# Patient Record
Sex: MALE | Race: WHITE | Employment: STUDENT | URBAN - METROPOLITAN AREA
[De-identification: names, ages, dates, MRNs, and addresses within clinical notes are randomized per-mention and may not be internally consistent; named-entity substitution may affect disease eponyms.]

---

## 2018-01-27 ENCOUNTER — OFFICE VISIT (OUTPATIENT)
Dept: FAMILY MEDICINE CLINIC | Age: 33
End: 2018-01-27

## 2018-01-27 VITALS
HEIGHT: 68 IN | BODY MASS INDEX: 32.13 KG/M2 | OXYGEN SATURATION: 98 % | DIASTOLIC BLOOD PRESSURE: 99 MMHG | TEMPERATURE: 99.4 F | SYSTOLIC BLOOD PRESSURE: 149 MMHG | WEIGHT: 212 LBS | RESPIRATION RATE: 16 BRPM | HEART RATE: 95 BPM

## 2018-01-27 DIAGNOSIS — F41.9 ANXIETY: ICD-10-CM

## 2018-01-27 DIAGNOSIS — M10.9 PODAGRA: Primary | ICD-10-CM

## 2018-01-27 DIAGNOSIS — Z13.31 SCREENING FOR DEPRESSION: ICD-10-CM

## 2018-01-27 RX ORDER — METHYLPREDNISOLONE 4 MG/1
TABLET ORAL
Qty: 1 DOSE PACK | Refills: 0 | Status: SHIPPED | OUTPATIENT
Start: 2018-01-27 | End: 2018-02-22

## 2018-01-27 RX ORDER — TRAMADOL HYDROCHLORIDE 50 MG/1
50 TABLET ORAL
Qty: 45 TAB | Refills: 0 | Status: SHIPPED | OUTPATIENT
Start: 2018-01-27 | End: 2020-08-18

## 2018-01-27 NOTE — PATIENT INSTRUCTIONS
Take medrol with food    Use tylenol or tramadol for pain    WELLNESS exam soon    If you drink alcohol, try to limit intake to no more than 1 beer (or one glass of wine or one shot of liquor) in any 24 hour period, and not daily.

## 2018-01-27 NOTE — PROGRESS NOTES
Sher Butler is a 29 y.o. male      Issues discussed today include:        Signs and symptoms:  Podagra left foot  Duration:  4 days  Context:  Known h/o gout  Location:  Left great toe  Quality:  Looks like gout  Severity:  Can be severe  Timin days constant  Modifying factors:  He drinks ETOH daily ( we discussed this)    Data reviewed or ordered today:  Needs CPx soon    Other problems include:  Patient Active Problem List   Diagnosis Code    Chest pain R07.9    Podagra M10.9       Medications:  Current Outpatient Prescriptions   Medication Sig Dispense Refill    methylPREDNISolone (MEDROL DOSEPACK) 4 mg tablet Take with food 1 Dose Pack 0    traMADol (ULTRAM) 50 mg tablet Take 1 Tab by mouth every six (6) hours as needed for Pain. Max Daily Amount: 200 mg. Indications: Pain 45 Tab 0    omeprazole (PRILOSEC) 20 mg capsule TAKE ONE CAPSULE BY MOUTH TWICE A DAY **INS COVERS 1/DAY* 60 Cap 6       Allergies:  No Known Allergies    LMP:  No LMP for male patient. Social History     Social History    Marital status: SINGLE     Spouse name: N/A    Number of children: N/A    Years of education: N/A     Occupational History    Not on file.      Social History Main Topics    Smoking status: Never Smoker    Smokeless tobacco: Current User     Types: Chew      Comment: once every 2 days    Alcohol use Yes      Comment: 2-3 drinks per night    Drug use: No    Sexual activity: Yes     Partners: Female     Other Topics Concern    Not on file     Social History Narrative         Family History   Problem Relation Age of Onset    Elevated Lipids Mother     High Cholesterol Mother     Pacemaker Paternal Grandfather     Coronary Artery Disease Paternal Uncle     Heart Attack Paternal Uncle      Other family history:  Gout, GERD, OA    Meaningful use:  done      ROS:  Headaches:  no  Chest Pain:  no  SOB:  no  Fevers:  no  Falls:  no  anxiety/depression/losing interest in doing things that were previously enjoyed:  no. PHQ2 = 0  Other significant ROS:  JAYLENE 7 = 5    No LMP for male patient. Physical Exam  Visit Vitals    BP (!) 149/99 (BP 1 Location: Left arm, BP Patient Position: Sitting)    Pulse 95    Temp 99.4 °F (37.4 °C)    Resp 16    Ht 5' 8\" (1.727 m)    Wt 212 lb (96.2 kg)    SpO2 98%    BMI 32.23 kg/m2     BP Readings from Last 3 Encounters:   01/27/18 (!) 149/99   10/15/14 140/90   09/16/14 134/90     Constitutional:  Appears well,  No Acute Distress, Vitals noted  Psychiatric:   Affect normal, Alert and cooperative, Oriented to person/place/time    Eyes:   Pupils equally round and reactive, EOMI, conjunctiva clear, eyelids normal  ENT:   External ears and nose normal/lips, teeth=OK/gums normal, TMs and Orophyarynx normal  Neck:   general inspection and Thyroid normal.  No abnormal cervical or supraclavicular nodes    Lungs:   clear to auscultation, good respiratory effort  Heart: Ausculation normal.  Regular rhythm. No cardiac murmurs. No carotid bruits or palpable thrills  Chest wall normal  Abd:  benign  Extremities:   without edema, good peripheral pulses  Skin:   Warm to palpation, without rashes, bruising, or suspicious lesions     MSK:  Podagra left great toe      Assessment:    Patient Active Problem List   Diagnosis Code    Chest pain R07.9    Podagra M10.9       Today's diagnoses are:    ICD-10-CM ICD-9-CM    1. Podagra M10.9 274.01 methylPREDNISolone (MEDROL DOSEPACK) 4 mg tablet      traMADol (ULTRAM) 50 mg tablet   2. Anxiety F41.9 300.00     JAYLENE 7=5, PHQ9 = 1   3. BMI 32.0-32.9,adult Z68.32 V85.32    4. Screening for depression Z13.89 V79.0     daily ETOH use, we discused this       Plan:  Orders Placed This Encounter    methylPREDNISolone (MEDROL DOSEPACK) 4 mg tablet     Sig: Take with food     Dispense:  1 Dose Pack     Refill:  0    traMADol (ULTRAM) 50 mg tablet     Sig: Take 1 Tab by mouth every six (6) hours as needed for Pain. Max Daily Amount: 200 mg. Indications: Pain     Dispense:  45 Tab     Refill:  0       See patient instructions  Patient Instructions   Take medrol with food    Use tylenol or tramadol for pain    WELLNESS exam soon    If you drink alcohol, try to limit intake to no more than 1 beer (or one glass of wine or one shot of liquor) in any 24 hour period, and not daily. refresh note:  done    AVS Printed:  done    Greater than 50% of today's 25 minute visit was counseling or coordination of care for the following reasons:    See diagnoses and orders, see patient instructions      We discussed health maintenance    BMI = Body mass index is 32.23 kg/(m^2). We discussed diet/exercise/healthy weight    We reviewed and updated pertinent past medical history in the problem list    Diagnoses and all orders for this visit:    1. Podagra  -     methylPREDNISolone (MEDROL DOSEPACK) 4 mg tablet; Take with food  -     traMADol (ULTRAM) 50 mg tablet; Take 1 Tab by mouth every six (6) hours as needed for Pain. Max Daily Amount: 200 mg. Indications: Pain    2. Anxiety  Comments:  JAYLENE 7=5, PHQ9 = 1    3. BMI 32.0-32.9,adult    4.  Screening for depression  Comments:  daily ETOH use, we discused this

## 2018-01-27 NOTE — LETTER
1/27/2018 10:37 AM 
 
Mr. Jacqueline Grant1 [facility] [address] Body mass index is 32.23 kg/(m^2). Focus on regular exercise (150 minutes each week) and healthy eating. Eat more fruits and vegetables. Eat more protein (egg whites, beans, and nuts you know you tolerate) and less carbohydrates (white bread, white rice, white pasta, white potatoes, sodas, and sweets). Eat appropriately small portion sizes. I suggest a wellness exam and fasting labs soon If you drink alcohol, try to limit intake to no more than 1 beer (or one glass of wine or one shot of liquor) in any 24 hour period, and not daily. Sincerely, Maeve Winchester MD

## 2018-01-27 NOTE — PROGRESS NOTES
Chief Complaint   Patient presents with    Gout     4 days ago gout flare up of redness, pain, swelling of the 1st digit of left toe     1. Have you been to the ER, urgent care clinic since your last visit? Hospitalized since your last visit? No    2. Have you seen or consulted any other health care providers outside of the [address] [place] [address] since your last visit? Include any pap smears or colon screening.  No

## 2018-01-27 NOTE — MR AVS SNAPSHOT
[address] 
[phone] Patient: Surendra Lawrence MRN: [id number] :1985 Visit Information Date & Time Provider Department Dept. Phone Encounter #  
 2018  9:45 AM Laura Ramirez MD [address] [facility] [phone] [id number] Your Appointments 2018  9:45 AM  
PHYSICAL PRE OP with Jacob Rojas MD  
[address] [facility] Appt Note: NP EST PCP, CPE, CP$20, SPT 18  
 [address]  
[phone]  
  
   
 [address] [facility] [address] [address] Upcoming Health Maintenance Date Due DTaP/Tdap/Td series (1 - Tdap) 2008 Influenza Age 5 to Adult 2017 Allergies as of 2018  Review Complete On: 2018 By: Evelyn Faith No Known Allergies Current Immunizations  Never Reviewed No immunizations on file. Not reviewed this visit You Were Diagnosed With   
  
 Codes Comments Podagra    -  Primary ICD-10-CM: M10.9 ICD-9-CM: 274.01 Anxiety     ICD-10-CM: F41.9 ICD-9-CM: 300.00 JAYLENE 7=5, PHQ9 = 1 BMI 32.0-32.9,adult     ICD-10-CM: H98.08 
ICD-9-CM: V85.32 Screening for depression     ICD-10-CM: Z13.89 ICD-9-CM: V79.0 daily ETOH use, we discused this Vitals BP Pulse Temp Resp Height(growth percentile) Weight(growth percentile) (!) 149/99 (BP 1 Location: Left arm, BP Patient Position: Sitting) 95 99.4 °F (37.4 °C) 16 5' 8\" (1.727 m) 212 lb (96.2 kg) SpO2 BMI Smoking Status 98% 32.23 kg/m2 Never Smoker Vitals History BMI and BSA Data Body Mass Index Body Surface Area  
 32.23 kg/m 2 2.15 m 2 Preferred Pharmacy Pharmacy Name Phone [id number] [place], [address] AT [facility] [phone] Your Updated Medication List  
  
   
 This list is accurate as of: 1/27/18 10:39 AM.  Always use your most recent med list.  
  
  
  
  
 methylPREDNISolone 4 mg tablet Commonly known as:  Ocasio Smart Take with food  
  
 omeprazole 20 mg capsule Commonly known as:  PRILOSEC  
TAKE ONE CAPSULE BY MOUTH TWICE A DAY **INS COVERS 1/DAY*  
  
 traMADol 50 mg tablet Commonly known as:  ULTRAM  
Take 1 Tab by mouth every six (6) hours as needed for Pain. Max Daily Amount: 200 mg. Indications: Pain Prescriptions Printed Refills  
 methylPREDNISolone (MEDROL DOSEPACK) 4 mg tablet 0 Sig: Take with food Class: Print  
 traMADol (ULTRAM) 50 mg tablet 0 Sig: Take 1 Tab by mouth every six (6) hours as needed for Pain. Max Daily Amount: 200 mg. Indications: Pain Class: Print Route: Oral  
  
Patient Instructions Take medrol with food Use tylenol or tramadol for pain WELLNESS exam soon If you drink alcohol, try to limit intake to no more than 1 beer (or one glass of wine or one shot of liquor) in any 24 hour period, and not daily. Introducing [facility]! [employer] introduces Pro[employer] patient portal. Now you can access parts of your medical record, email your doctor's office, and request medication refills online. 1. In your internet browser, go to [email or web address] 2. Click on the First Time User? Click Here link in the Sign In box. You will see the New Member Sign Up page. 3. Enter your Proxeon Access Code exactly as it appears below. You will not need to use this code after youve completed the sign-up process. If you do not sign up before the expiration date, you must request a new code. · Proxeon Access Code: [email or web address] Expires: 4/27/2018 10:39 AM 
 
4. Enter the last four digits of your Social Security Number (xxxx) and Date of Birth (mm/dd/yyyy) as indicated and click Submit. You will be taken to the next sign-up page. 5. Create a 33Ac[employer] ID. This will be your 33Ac[employer] login ID and cannot be changed, so think of one that is secure and easy to remember. 6. Create a [employer]Ac[employer] password. You can change your password at any time. 7. Enter your Password Reset Question and Answer. This can be used at a later time if you forget your password. 8. Enter your e-mail address. You will receive e-mail notification when new information is available in [address] 9. Click Sign Up. You can now view and download portions of your medical record. 10. Click the Download Summary menu link to download a portable copy of your medical information. If you have questions, please visit the Frequently Asked Questions section of the [employer] website. Remember, 33Ac[employer] is NOT to be used for urgent needs. For medical emergencies, dial 911. Now available from your iPhone and Android! Please provide this summary of care documentation to your next provider. Your primary care clinician is listed as Cary Tate. If you have any questions after today's visit, please call [phone].

## 2018-02-22 ENCOUNTER — OFFICE VISIT (OUTPATIENT)
Dept: FAMILY MEDICINE CLINIC | Age: 33
End: 2018-02-22

## 2018-02-22 VITALS
DIASTOLIC BLOOD PRESSURE: 88 MMHG | RESPIRATION RATE: 18 BRPM | HEART RATE: 62 BPM | HEIGHT: 68 IN | WEIGHT: 210 LBS | BODY MASS INDEX: 31.83 KG/M2 | SYSTOLIC BLOOD PRESSURE: 142 MMHG | TEMPERATURE: 97.5 F

## 2018-02-22 DIAGNOSIS — Z00.00 ROUTINE GENERAL MEDICAL EXAMINATION AT A HEALTH CARE FACILITY: Primary | ICD-10-CM

## 2018-02-22 DIAGNOSIS — M10.09 ACUTE IDIOPATHIC GOUT OF MULTIPLE SITES: ICD-10-CM

## 2018-02-22 DIAGNOSIS — R03.0 ELEVATED BLOOD PRESSURE READING: ICD-10-CM

## 2018-02-22 DIAGNOSIS — Z72.0 CHEWING TOBACCO USE: ICD-10-CM

## 2018-02-22 PROBLEM — M10.49 OTHER SECONDARY GOUT, MULTIPLE SITES: Status: ACTIVE | Noted: 2018-02-22

## 2018-02-22 PROBLEM — K21.9 GERD (GASTROESOPHAGEAL REFLUX DISEASE): Status: ACTIVE | Noted: 2018-02-22

## 2018-02-22 PROBLEM — G43.109 MIGRAINE WITH AURA AND WITHOUT STATUS MIGRAINOSUS, NOT INTRACTABLE: Status: ACTIVE | Noted: 2018-02-22

## 2018-02-22 RX ORDER — HYDROXYZINE PAMOATE 25 MG/1
CAPSULE ORAL
Refills: 5 | COMMUNITY
Start: 2018-01-30 | End: 2020-08-18

## 2018-02-22 RX ORDER — ALLOPURINOL 100 MG/1
100 TABLET ORAL DAILY
Qty: 30 TAB | Refills: 0 | Status: SHIPPED | OUTPATIENT
Start: 2018-02-22 | End: 2018-03-20 | Stop reason: SDUPTHER

## 2018-02-22 RX ORDER — COLCHICINE 0.6 MG/1
TABLET ORAL
Qty: 31 TAB | Refills: 5 | Status: SHIPPED | OUTPATIENT
Start: 2018-02-22 | End: 2018-08-17 | Stop reason: SDUPTHER

## 2018-02-22 NOTE — PROGRESS NOTES
Chief Complaint   Patient presents with    Establish Care     cpe    Gout     rt ankle     1. Have you been to the ER, urgent care clinic since your last visit? No Hospitalized since your last visit? No     2. Have you seen or consulted any other health care providers outside of the [address] [place] [address] since your last visit? Include any pap smears or colon screening.  No

## 2018-02-22 NOTE — PROGRESS NOTES
Subjective:  Will Lee is a33 y.o. y.o. male presenting for his annual checkup. Specific concerns today: he has a gout flare up in his right ankle and great toe, that has been present for 1 1/2 weeks. He has been pushing fluids, drinking vinegar and tart cherry juice. Health Habits/Lifestyle  Occupation:   for a [employer]  Household members:  3 - patient, spouse, daughter  Last dental appointment:   Over a year ago  Last eye exam:  yesterday  Uses seatbelts regularly :  yes  Getting regular exercise:  yes    ROS:  Feeling well. No dyspnea or chest pain on exertion. No abdominal pain, change in bowel habits, black or bloody stools. No urinary tract or prostatic symptoms. No neurological complaints except for migraines about once a month. 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 Max Daily Amount: 200 mg.  Indications: Pain 45 Tab 0    omeprazole (PRILOSEC) 20 mg capsule TAKE ONE CAPSULE BY MOUTH TWICE A DAY **INS COVERS 1/DAY* 60 Cap 6     No Known Allergies  Past Medical History:   Diagnosis Date    GERD (gastroesophageal reflux disease)     Gout      Past Surgical History:   Procedure Laterality Date    HX ORTHOPAEDIC Left 2011    tibia surgery and torn mencius     Family History Problem Relation Age of Onset    Elevated Lipids Mother     Depression Mother    [facility] Anxiety Mother     Migraines Mother     Pacemaker Paternal Grandfather     Elevated Lipids Father     Hypertension Father     Migraines Father     Coronary Artery Disease Paternal Uncle     Heart Attack Paternal Uncle     Asthma Brother     Asthma Brother     No Known Problems Daughter      Social History   Substance Use Topics    Smoking status: Former Smoker    Smokeless tobacco: Current User     Types: Chew      Comment: once every 2 days    Alcohol use No      Comment: 2-3 drinks per night        Objective:    Visit Vitals    /88    Pulse 62    Temp 97.5 °F (36.4 °C) (Oral)    Resp 18    Ht 5' 8\" (1.727 m)    Wt 210 lb (95.3 kg)    BMI 31.93 kg/m2     The patient appears well, alert, oriented x 3, in no distress. ENT normal.  Neck supple. No adenopathy or thyromegaly. MARYSE. Lungs are clear, good air entry, no wheezes, rhonchi or rales. S1 and S2 normal, no murmurs, regular rate and rhythm. Abdomen is soft without tenderness, guarding, mass or organomegaly.  exam: no penile lesions or discharge, no testicular masses or tenderness, no hernias. Extremities show no edema, normal peripheral pulses. Neurological is normal without focal findings. Musculoskeletal-right great MTP tender, red, warm and swollen    Assessment/Plan:    ICD-10-CM ICD-9-CM    1. Routine general medical examination at a health care facility C26.92 N19.1 METABOLIC PANEL, COMPREHENSIVE      LIPID PANEL      CBC WITH AUTOMATED DIFF   2. Other secondary acute gout of multiple sites O03.63 361.5 METABOLIC PANEL, COMPREHENSIVE      URIC ACID      colchicine 0.6 mg tablet      allopurinol (ZYLOPRIM) 100 mg tablet      URIC ACID   3. Elevated blood pressure reading R03.0 796.2    4. Chewing tobacco use Z72.0 305.1        Dental exam  Labs per orders.   Start Colchicine  Start allopurinol with uric acid level in 2 weeks  Counseled to stop chewing tobacco     Follow-up Disposition:  Return in about 3 months (around 5/22/2018) for blood pressure. .      Reviewed plan of care. Patient has provided input and agrees with goals.

## 2018-02-22 NOTE — PATIENT INSTRUCTIONS
Gout: Care Instructions  Your Care Instructions    Gout is a form of arthritis caused by a buildup of uric acid crystals in a joint. It causes sudden attacks of pain, swelling, redness, and stiffness, usually in one joint, especially the big toe. Gout usually comes on without a cause. But it can be brought on by drinking alcohol (especially beer) or eating seafood and red meat. Taking certain medicines, such as diuretics or aspirin, also can bring on an attack of gout. Taking your medicines as prescribed and following up with your doctor regularly can help you avoid gout attacks in the future. Follow-up care is a key part of your treatment and safety. Be sure to make and go to all appointments, and call your doctor if you are having problems. It's also a good idea to know your test results and keep a list of the medicines you take. How can you care for yourself at home? · If the joint is swollen, put ice or a cold pack on the area for 10 to 20 minutes at a time. Put a thin cloth between the ice and your skin. · Prop up the sore limb on a pillow when you ice it or anytime you sit or lie down during the next 3 days. Try to keep it above the level of your heart. This will help reduce swelling. · Rest sore joints. Avoid activities that put weight or strain on the joints for a few days. Take short rest breaks from your regular activities during the day. · Take your medicines exactly as prescribed. Call your doctor if you think you are having a problem with your medicine. · Take pain medicines exactly as directed. ¨ If the doctor gave you a prescription medicine for pain, take it as prescribed. ¨ If you are not taking a prescription pain medicine, ask your doctor if you can take an over-the-counter medicine. · Eat less seafood and red meat. · Check with your doctor before drinking alcohol. · Losing weight, if you are overweight, may help reduce attacks of gout. But do not go on a [employer]. \" Losing a lot of weight in a short amount of time can cause a gout attack. When should you call for help? Call your doctor now or seek immediate medical care if:  ? · You have a fever. ? · The joint is so painful you cannot use it. ? · You have sudden, unexplained swelling, redness, warmth, or severe pain in one or more joints. ? Watch closely for changes in your health, and be sure to contact your doctor if:  ? · You have joint pain. ? · Your symptoms get worse or are not improving after 2 or 3 days. Where can you learn more? Go to http://felecia-ivis.info/. Enter T647 in the search box to learn more about \"Gout: Care Instructions. \"  Current as of: October 31, 2016  Content Version: 11.4  © 7828-1557 [employer]. Care instructions adapted under license by [employer] (which disclaims liability or warranty for this information). If you have questions about a medical condition or this instruction, always ask your healthcare professional. [place] [address] any warranty or liability for your use of this information. Purine-Restricted Diet: Care Instructions  Your Care Instructions    Purines are substances that are found in some foods. Your body turns purines into uric acid. High levels of uric acid can cause gout, which is a form of arthritis that causes pain and inflammation in joints. You may be able to help control the amount of uric acid in your body by limiting high-purine foods in your diet. Follow-up care is a key part of your treatment and safety. Be sure to make and go to all appointments, and call your doctor if you are having problems. It's also a good idea to know your test results and keep a list of the medicines you take. How can you care for yourself at home? · Plan your meals and snacks around foods that are low in purines and are safe for you to eat. These foods include:  ¨ Green vegetables and tomatoes. ¨ Fruits.   ¨ Whole-grain breads, rice, and cereals. ¨ Eggs, peanut butter, and nuts. ¨ Low-fat milk, cheese, and other milk products. ¨ Popcorn. ¨ Gelatin desserts, chocolate, cocoa, and cakes and sweets, in small amounts. · You can eat certain foods that are medium-high in purines, but eat them only once in a while. These foods include:  ¨ Legumes, such as dried beans and dried peas. You can have 1 cup cooked legumes each day. ¨ Asparagus, cauliflower, spinach, mushrooms, and green peas. ¨ Fish and seafood (other than very high-purine seafood). ¨ Oatmeal, wheat bran, and wheat germ. · Limit very high-purine foods, including:  ¨ Organ meats, such as liver, kidneys, sweetbreads, and brains. ¨ Meats, including phelps, beef, pork, and lamb. ¨ Game meats and any other meats in large amounts. ¨ Anchovies, sardines, herring, mackerel, and scallops. ¨ Gravy. ¨ Beer. Where can you learn more? Go to http://felecia-ivis.info/. Enter F448 in the search box to learn more about \"Purine-Restricted Diet: Care Instructions. \"  Current as of: May 12, 2017  Content Version: 11.4  © 9372-5721 [employer]. Care instructions adapted under license by [employer] (which disclaims liability or warranty for this information). If you have questions about a medical condition or this instruction, always ask your healthcare professional. [place] [address] any warranty or liability for your use of this information.

## 2018-02-22 NOTE — MR AVS SNAPSHOT
[address] 
[phone] Patient: Aron Haskins MRN: [id number] :1985 Visit Information Date & Time Provider Department Dept. Phone Encounter #  
 2018  9:45 AM Kristy Mora 34 [id number] Follow-up Instructions Return in about 1 year (around 2019) for physical.  
  
Upcoming Health Maintenance Date Due DTaP/Tdap/Td series (1 - Tdap) 2008 Allergies as of 2018  Review Complete On: 2018 By: Neftali Cxo MD  
 No Known Allergies Current Immunizations  Never Reviewed No immunizations on file. Not reviewed this visit You Were Diagnosed With   
  
 Codes Comments Routine general medical examination at a health care facility    -  Primary ICD-10-CM: Z00.00 ICD-9-CM: V70.0 Other secondary acute gout of multiple sites     ICD-10-CM: M10.49 ICD-9-CM: 274.9 Chewing tobacco use     ICD-10-CM: Z72.0 ICD-9-CM: 305.1 Vitals BP Pulse Temp Resp Height(growth percentile) Weight(growth percentile) 142/88 62 97.5 °F (36.4 °C) (Oral) 18 5' 8\" (1.727 m) 210 lb (95.3 kg) BMI Smoking Status 31.93 kg/m2 Former Smoker Vitals History BMI and BSA Data Body Mass Index Body Surface Area  
 31.93 kg/m 2 2.14 m 2 Preferred Pharmacy Pharmacy Name Phone [id number] jose rafaela Eisenmen[place], [place] [address] [phone] Your Updated Medication List  
  
   
This list is accurate as of 18 11:21 AM.  Always use your most recent med list.  
  
  
  
  
 allopurinol 100 mg tablet Commonly known as:  Delona Gauze Take 1 Tab by mouth daily. colchicine 0.6 mg tablet One PO now, repeat in 2 hours, then one PO QD  
  
 hydrOXYzine pamoate 25 mg capsule Commonly known as:  VISTARIL  
TAKE ONE CAPSULE BY MOUTH TWICE A DAY AS NEEDED FOR ANXIETY omeprazole 20 mg capsule Commonly known as:  PRILOSEC  
TAKE ONE CAPSULE BY MOUTH TWICE A DAY **INS COVERS 1/DAY*  
  
 traMADol 50 mg tablet Commonly known as:  ULTRAM  
Take 1 Tab by mouth every six (6) hours as needed for Pain. Max Daily Amount: 200 mg. Indications: Pain Prescriptions Sent to Pharmacy Refills  
 colchicine 0.6 mg tablet 5 Sig: One PO now, repeat in 2 hours, then one PO QD Class: Normal  
 [id number] [address] #: [phone]  
 allopurinol (ZYLOPRIM) 100 mg tablet 0 Sig: Take 1 Tab by mouth daily. Class: Normal  
 Pharmacy: [facility]/pharmacy [address] [place] [address] #: [phone] Route: Oral  
  
We Performed the Following CBC WITH AUTOMATED DIFF [47278 CPT(R)] LIPID PANEL [01305 CPT(R)] METABOLIC PANEL, COMPREHENSIVE [71339 CPT(R)] URIC ACID [id number] CPT(R)] Follow-up Instructions Return in about 1 year (around 2/22/2019) for physical.  
  
To-Do List   
 Around 03/08/2018 Lab:  URIC ACID Patient Instructions Gout: Care Instructions Your Care Instructions Gout is a form of arthritis caused by a buildup of uric acid crystals in a joint. It causes sudden attacks of pain, swelling, redness, and stiffness, usually in one joint, especially the big toe. Gout usually comes on without a cause. But it can be brought on by drinking alcohol (especially beer) or eating seafood and red meat. Taking certain medicines, such as diuretics or aspirin, also can bring on an attack of gout. Taking your medicines as prescribed and following up with your doctor regularly can help you avoid gout attacks in the future. Follow-up care is a key part of your treatment and safety. Be sure to make and go to all appointments, and call your doctor if you are having problems. It's also a good idea to know your test results and keep a list of the medicines you take. How can you care for yourself at home? · If the joint is swollen, put ice or a cold pack on the area for 10 to 20 minutes at a time. Put a thin cloth between the ice and your skin. · Prop up the sore limb on a pillow when you ice it or anytime you sit or lie down during the next 3 days. Try to keep it above the level of your heart. This will help reduce swelling. · Rest sore joints. Avoid activities that put weight or strain on the joints for a few days. Take short rest breaks from your regular activities during the day. · Take your medicines exactly as prescribed. Call your doctor if you think you are having a problem with your medicine. · Take pain medicines exactly as directed. ¨ If the doctor gave you a prescription medicine for pain, take it as prescribed. ¨ If you are not taking a prescription pain medicine, ask your doctor if you can take an over-the-counter medicine. · Eat less seafood and red meat. · Check with your doctor before drinking alcohol. · Losing weight, if you are overweight, may help reduce attacks of gout. But do not go on a [employer]. \" Losing a lot of weight in a short amount of time can cause a gout attack. When should you call for help? Call your doctor now or seek immediate medical care if: 
? · You have a fever. ? · The joint is so painful you cannot use it. ? · You have sudden, unexplained swelling, redness, warmth, or severe pain in one or more joints. ? Watch closely for changes in your health, and be sure to contact your doctor if: 
? · You have joint pain. ? · Your symptoms get worse or are not improving after 2 or 3 days. Where can you learn more? Go to http://felecia-ivis.info/. Enter G564 in the search box to learn more about \"Gout: Care Instructions. \" Current as of: October 31, 2016 Content Version: 11.4 © 6037-6747 Healthwise, Incorporated.  Care instructions adapted under license by [facility] [address] (which disclaims liability or warranty for this information). If you have questions about a medical condition or this instruction, always ask your healthcare professional. [address] any warranty or liability for your use of this information. Purine-Restricted Diet: Care Instructions Your Care Instructions Purines are substances that are found in some foods. Your body turns purines into uric acid. High levels of uric acid can cause gout, which is a form of arthritis that causes pain and inflammation in joints. You may be able to help control the amount of uric acid in your body by limiting high-purine foods in your diet. Follow-up care is a key part of your treatment and safety. Be sure to make and go to all appointments, and call your doctor if you are having problems. It's also a good idea to know your test results and keep a list of the medicines you take. How can you care for yourself at home? · Plan your meals and snacks around foods that are low in purines and are safe for you to eat. These foods include: ¨ Green vegetables and tomatoes. ¨ Fruits. ¨ Whole-grain breads, rice, and cereals. ¨ Eggs, peanut butter, and nuts. ¨ Low-fat milk, cheese, and other milk products. ¨ Popcorn. ¨ Gelatin desserts, chocolate, cocoa, and cakes and sweets, in small amounts. · You can eat certain foods that are medium-high in purines, but eat them only once in a while. These foods include: ¨ Legumes, such as dried beans and dried peas. You can have 1 cup cooked legumes each day. ¨ Asparagus, cauliflower, spinach, mushrooms, and green peas. ¨ Fish and seafood (other than very high-purine seafood). ¨ Oatmeal, wheat bran, and wheat germ. · Limit very high-purine foods, including: ¨ Organ meats, such as liver, kidneys, sweetbreads, and brains. ¨ Meats, including phelps, beef, pork, and lamb. ¨ Game meats and any other meats in large amounts. ¨ Anchovies, sardines, herring, mackerel, and scallops. ¨ Gravy. ¨ Beer. Where can you learn more? Go to http://felecia-ivis.info/. Enter F448 in the search box to learn more about \"Purine-Restricted Diet: Care Instructions. \" Current as of: May 12, 2017 Content Version: 11.4 © 1254-1446 [employer]. Care instructions adapted under license by [employer] (which disclaims liability or warranty for this information). If you have questions about a medical condition or this instruction, always ask your healthcare professional. [place] [address] any warranty or liability for your use of this information. Introducing [facility] & HEALTH SERVICES! Saravanan Muller introduces Trello patient portal. Now you can access parts of your medical record, email your doctor's office, and request medication refills online. 1. In your internet browser, go to [email or web address] 2. Click on the First Time User? Click Here link in the Sign In box. You will see the New Member Sign Up page. 3. Enter your Trello Access Code exactly as it appears below. You will not need to use this code after youve completed the sign-up process. If you do not sign up before the expiration date, you must request a new code. · Trello Access Code: [email or web address] Expires: 4/27/2018 10:39 AM 
 
4. Enter the last four digits of your Social Security Number (xxxx) and Date of Birth (mm/dd/yyyy) as indicated and click Submit. You will be taken to the next sign-up page. 5. Create a [employer] ID. This will be your Trello login ID and cannot be changed, so think of one that is secure and easy to remember. 6. Create a Trello password. You can change your password at any time. 7. Enter your Password Reset Question and Answer. This can be used at a later time if you forget your password. 8. Enter your e-mail address.  You will receive e-mail notification when new information is available in [employer]. 9. Click Sign Up. You can now view and download portions of your medical record. 10. Click the Download Summary menu link to download a portable copy of your medical information. If you have questions, please visit the Frequently Asked Questions section of the [employer] website. Remember, [employer] is NOT to be used for urgent needs. For medical emergencies, dial 911. Now available from your iPhone and Android! Please provide this summary of care documentation to your next provider. Your primary care clinician is listed as Zora Osborne. If you have any questions after today's visit, please call [phone].

## 2018-02-23 LAB
ALBUMIN SERPL-MCNC: 4.5 G/DL (ref 3.5–5.5)
ALBUMIN/GLOB SERPL: 1.5 {RATIO} (ref 1.2–2.2)
ALP SERPL-CCNC: 67 IU/L (ref 39–117)
ALT SERPL-CCNC: 38 IU/L (ref 0–44)
AST SERPL-CCNC: 28 IU/L (ref 0–40)
BASOPHILS # BLD AUTO: 0 X10E3/UL (ref 0–0.2)
BASOPHILS NFR BLD AUTO: 0 %
BILIRUB SERPL-MCNC: 0.6 MG/DL (ref 0–1.2)
BUN SERPL-MCNC: 11 MG/DL (ref 6–20)
BUN/CREAT SERPL: 9 (ref 9–20)
CALCIUM SERPL-MCNC: 9.5 MG/DL (ref 8.7–10.2)
CHLORIDE SERPL-SCNC: 95 MMOL/L (ref 96–106)
CHOLEST SERPL-MCNC: 207 MG/DL (ref 100–199)
CO2 SERPL-SCNC: 24 MMOL/L (ref 18–29)
CREAT SERPL-MCNC: 1.26 MG/DL (ref 0.76–1.27)
EOSINOPHIL # BLD AUTO: 0.2 X10E3/UL (ref 0–0.4)
EOSINOPHIL NFR BLD AUTO: 3 %
ERYTHROCYTE [DISTWIDTH] IN BLOOD BY AUTOMATED COUNT: 13.7 % (ref 12.3–15.4)
GFR SERPLBLD CREATININE-BSD FMLA CKD-EPI: 76 ML/MIN/{1.73_M2}
GFR SERPLBLD CREATININE-BSD FMLA CKD-EPI: 88 ML/MIN/{1.73_M2}
GLOBULIN SER CALC-MCNC: 3 G/L (ref 1.5–4.5)
GLUCOSE SERPL-MCNC: 89 MG/DL (ref 65–99)
HCT VFR BLD AUTO: 40.1 % (ref 37.5–51)
HDLC SERPL-MCNC: 36 MG/DL
HGB BLD-MCNC: 13.8 G/DL (ref 13–17.7)
IMM GRANULOCYTES # BLD: 0 X10E3/UL (ref 0–0.1)
IMM GRANULOCYTES NFR BLD: 0 %
INTERPRETATION, 910389: NORMAL
LDLC SERPL CALC-MCNC: ABNORMAL MG/DL (ref 0–99)
LYMPHOCYTES # BLD AUTO: 2.5 X10E3/UL (ref 0.7–3.1)
LYMPHOCYTES NFR BLD AUTO: 34 %
MCH RBC QN AUTO: 30.5 PG (ref 26.6–33)
MCHC RBC AUTO-ENTMCNC: 34.4 G/DL (ref 31.5–35.7)
MCV RBC AUTO: 89 FL (ref 79–97)
MONOCYTES # BLD AUTO: 0.7 X10E3/UL (ref 0.1–0.9)
MONOCYTES NFR BLD AUTO: 9 %
NEUTROPHILS # BLD AUTO: 4 X10E3/UL (ref 1.4–7)
NEUTROPHILS NFR BLD AUTO: 54 %
PDF IMAGE, 910387: NORMAL
PLATELET # BLD AUTO: 257 X10E3/UL (ref 150–379)
POTASSIUM SERPL-SCNC: 4.4 MMOL/L (ref 3.5–5.2)
PROT SERPL-MCNC: 7.5 G/DL (ref 6–8.5)
RBC # BLD AUTO: 4.53 X10E6/UL (ref 4.14–5.8)
SODIUM SERPL-SCNC: 137 MMOL/L (ref 134–144)
TRIGL SERPL-MCNC: 442 MG/DL (ref 0–149)
URATE SERPL-MCNC: 8 MG/DL (ref 3.7–8.6)
VLDLC SERPL CALC-MCNC: ABNORMAL MG/DL (ref 5–40)
WBC # BLD AUTO: 7.4 X10E3/UL (ref 3.4–10.8)

## 2018-02-25 PROBLEM — M10.09 IDIOPATHIC GOUT OF MULTIPLE SITES: Status: ACTIVE | Noted: 2018-02-25

## 2018-03-08 DIAGNOSIS — M10.09 ACUTE IDIOPATHIC GOUT OF MULTIPLE SITES: ICD-10-CM

## 2018-04-19 DIAGNOSIS — M10.09 ACUTE IDIOPATHIC GOUT OF MULTIPLE SITES: ICD-10-CM

## 2018-04-23 RX ORDER — ALLOPURINOL 100 MG/1
TABLET ORAL
Qty: 30 TAB | Refills: 0 | Status: SHIPPED | OUTPATIENT
Start: 2018-04-23 | End: 2018-06-20

## 2018-06-20 ENCOUNTER — OFFICE VISIT (OUTPATIENT)
Dept: FAMILY MEDICINE CLINIC | Age: 33
End: 2018-06-20

## 2018-06-20 VITALS
SYSTOLIC BLOOD PRESSURE: 145 MMHG | WEIGHT: 214 LBS | RESPIRATION RATE: 18 BRPM | HEART RATE: 58 BPM | BODY MASS INDEX: 32.43 KG/M2 | DIASTOLIC BLOOD PRESSURE: 79 MMHG | TEMPERATURE: 97.8 F | HEIGHT: 68 IN

## 2018-06-20 DIAGNOSIS — Z23 ENCOUNTER FOR IMMUNIZATION: ICD-10-CM

## 2018-06-20 DIAGNOSIS — M10.09 ACUTE IDIOPATHIC GOUT OF MULTIPLE SITES: Primary | ICD-10-CM

## 2018-06-20 DIAGNOSIS — I10 ESSENTIAL HYPERTENSION: ICD-10-CM

## 2018-06-20 RX ORDER — AMLODIPINE BESYLATE 5 MG/1
5 TABLET ORAL DAILY
Qty: 30 TAB | Refills: 1 | Status: SHIPPED | OUTPATIENT
Start: 2018-06-20 | End: 2018-08-17 | Stop reason: SDUPTHER

## 2018-06-20 RX ORDER — PREDNISONE 20 MG/1
20 TABLET ORAL 3 TIMES DAILY
Qty: 15 TAB | Refills: 0 | Status: SHIPPED | OUTPATIENT
Start: 2018-06-20 | End: 2018-06-25

## 2018-06-20 NOTE — PROGRESS NOTES
Chief Complaint   Patient presents with    Gout     left big toe x last night     1. Have you been to the ER, urgent care clinic since your last visit? No Hospitalized since your last visit? No     2. Have you seen or consulted any other health care providers outside of the [address] [place] [address] since your last visit? Include any pap smears or colon screening.  No

## 2018-06-20 NOTE — PROGRESS NOTES
HISTORY OF PRESENT ILLNESS  Dolores Hutchinson is a 29 y.o. male. HPI Comments: Dolores Hutchinson is here due to a gout flare in his left great toe starting yesterday. He was on allopurinol, but stopped it when he ran out, however, he continued the colchicine, which he has been taking every day. He drinks about 2 drinks a day, and this has decreased lately. Also, he is watching the purine intake in his diet. This is the third time his blood pressure has been elevated. His parents have HTN. Review of Systems   Constitutional: Negative for chills and fever. Musculoskeletal: Positive for gout and joint pain. Left MTP swelling   Skin:        Left MTP redness and warmth       Visit Vitals    /79    Pulse (!) 58    Temp 97.8 °F (36.6 °C) (Oral)    Resp 18    Ht 5' 8\" (1.727 m)    Wt 214 lb (97.1 kg)    BMI 32.54 kg/m2     BP Readings from Last 3 Encounters:   06/20/18 145/79   02/22/18 142/88   01/27/18 (!) 149/99     Physical Exam   Constitutional: He is oriented to person, place, and time. He appears well-developed and well-nourished. No distress. Musculoskeletal:        Feet:    Neurological: He is alert and oriented to person, place, and time. Skin: He is not diaphoretic. Left MTP redness and warmth       ASSESSMENT and PLAN    ICD-10-CM ICD-9-CM    1. Acute idiopathic gout of multiple sites M10.09 274.01 predniSONE (DELTASONE) 20 mg tablet   2. Essential hypertension I10 401.9 amLODIPine (NORVASC) 5 mg tablet   3. Encounter for immunization Z23 V03.89 diphtheria-pertussis, acellular,-tetanus (BOOSTRIX TDAP) 2.5-8-5 Lf-mcg-Lf/0.5mL susp susp        Recurrent acute gout, on colchicine but stopped allopurinol  Newly diagnosed HTN  Take an additional colchicine today and repeat in 2 hours  Prednisone if colchicine not effective  Start Norvasc  DTAP at pharmacy  Over 15 minutes was spent face to face with the patient.   Over 1/2 the time was spent counseling him about the pathophysiology of gout and management. Follow-up Disposition:  Return in about 2 weeks (around 7/4/2018) for blood pressure, gout, check uric acid. Reviewed plan of care. Patient has provided input and agrees with goals.

## 2018-06-20 NOTE — MR AVS SNAPSHOT
[address] 
[phone] Patient: Munira Guerrero MRN: [id number] :1985 Visit Information Date & Time Provider Department Dept. Phone Encounter #  
 2018 10:30 AM Kristy Howell 34 [id number] Follow-up Instructions Return in about 2 weeks (around 2018) for blood pressure, gout, check uric acid. Upcoming Health Maintenance Date Due DTaP/Tdap/Td series (1 - Tdap) 2008 Influenza Age 5 to Adult 2018 Allergies as of 2018  Review Complete On: 2018 By: Perla Khan MD  
 No Known Allergies Current Immunizations  Never Reviewed No immunizations on file. Not reviewed this visit You Were Diagnosed With   
  
 Codes Comments Acute idiopathic gout of multiple sites    -  Primary ICD-10-CM: M10.09 
ICD-9-CM: 274.01 Essential hypertension     ICD-10-CM: I10 
ICD-9-CM: 401.9 Encounter for immunization     ICD-10-CM: S25 ICD-9-CM: V03.89 Vitals BP Pulse Temp Resp Height(growth percentile) Weight(growth percentile) 145/79 (!) 58 97.8 °F (36.6 °C) (Oral) 18 5' 8\" (1.727 m) 214 lb (97.1 kg) BMI Smoking Status 32.54 kg/m2 Former Smoker Vitals History BMI and BSA Data Body Mass Index Body Surface Area 32.54 kg/m 2 2.16 m 2 Preferred Pharmacy Pharmacy Name Phone [id number] [facility] Your Updated Medication List  
  
   
This list is accurate as of 18 11:41 AM.  Always use your most recent med list. amLODIPine 5 mg tablet Commonly known as:  Crispin Almazan Take 1 Tab by mouth daily. colchicine 0.6 mg tablet One PO now, repeat in 2 hours, then one PO QD  
  
 diphtheria-pertussis (acellular)-tetanus 2.5-8-5 Lf-mcg-Lf/0.5mL Susp susp Commonly known as:  BOOSTRIX TDAP  
0.5 mL by IntraMUSCular route once for 1 dose.  
  
 hydrOXYzine pamoate 25 mg capsule Commonly known as:  VISTARIL  
TAKE ONE CAPSULE BY MOUTH TWICE A DAY AS NEEDED FOR ANXIETY  
  
 omeprazole 20 mg capsule Commonly known as:  PRILOSEC  
TAKE ONE CAPSULE BY MOUTH TWICE A DAY **INS COVERS 1/DAY* predniSONE 20 mg tablet Commonly known as:  Lennart Spearing Take 1 Tab by mouth three (3) times daily for 5 days. traMADol 50 mg tablet Commonly known as:  ULTRAM  
Take 1 Tab by mouth every six (6) hours as needed for Pain. Max Daily Amount: 200 mg. Indications: Pain Prescriptions Sent to Pharmacy Refills diphtheria-pertussis, acellular,-tetanus (BOOSTRIX TDAP) 2.5-8-5 Lf-mcg-Lf/0.5mL susp susp 0 Si.5 mL by IntraMUSCular route once for 1 dose. Class: Normal  
 Pharmacy: [address] [place] [address] #: [phone] Route: IntraMUSCular  
 predniSONE (DELTASONE) 20 mg tablet 0 Sig: Take 1 Tab by mouth three (3) times daily for 5 days. Class: Normal  
 Pharmacy: [address] [place] [address] #: [phone] Route: Oral  
 amLODIPine (NORVASC) 5 mg tablet 1 Sig: Take 1 Tab by mouth daily. Class: Normal  
 Pharmacy: [address] [place] [address] #: [phone] Route: Oral  
  
Follow-up Instructions Return in about 2 weeks (around 2018) for blood pressure, gout, check uric acid. Introducing [facility] & HEALTH SERVICES! Dear Shahid Line: Thank you for requesting a [employer] account. Our records indicate that you already have an active [employer] account. You can access your account anytime at [email or web address] Did you know that you can access your hospital and ER discharge instructions at any time in [employer]? You can also review all of your test results from your hospital stay or ER visit. Additional Information If you have questions, please visit the Frequently Asked Questions section of the [employer] website at [email or web address] Remember, Nugg-it is NOT to be used for urgent needs. For medical emergencies, dial 911. Now available from your iPhone and Android! Please provide this summary of care documentation to your next provider. Your primary care clinician is listed as Shiraz Umaña. If you have any questions after today's visit, please call [phone].

## 2018-07-05 ENCOUNTER — OFFICE VISIT (OUTPATIENT)
Dept: FAMILY MEDICINE CLINIC | Age: 33
End: 2018-07-05

## 2018-07-05 VITALS
WEIGHT: 215 LBS | RESPIRATION RATE: 20 BRPM | HEIGHT: 68 IN | SYSTOLIC BLOOD PRESSURE: 150 MMHG | BODY MASS INDEX: 32.58 KG/M2 | DIASTOLIC BLOOD PRESSURE: 105 MMHG | HEART RATE: 65 BPM | TEMPERATURE: 98 F

## 2018-07-05 DIAGNOSIS — Z72.0 CHEWING TOBACCO USE: ICD-10-CM

## 2018-07-05 DIAGNOSIS — M1A.09X0 IDIOPATHIC CHRONIC GOUT OF MULTIPLE SITES WITHOUT TOPHUS: ICD-10-CM

## 2018-07-05 DIAGNOSIS — I10 ESSENTIAL HYPERTENSION: Primary | ICD-10-CM

## 2018-07-05 NOTE — ASSESSMENT & PLAN NOTE
Check uric acid, recommend restart allopurinal and titrate to goal of <6  Pt agreeable  C/w diet and hydration

## 2018-07-05 NOTE — MR AVS SNAPSHOT
[address] 
[phone] Patient: Sharon Nassar MRN: [id number] :1985 Visit Information Date & Time Provider Department Dept. Phone Encounter #  
 2018  4:30 PM rKisty Knapp 34 [id number] Follow-up Instructions Return in about 4 weeks (around 2018) for recheck bp, discuss gout. Follow-up and Disposition History Your Appointments 2018  4:30 PM  
ROUTINE CARE with Elizabeth Hughes MD  
[address] [place] [address]) Appt Note: 2 week f/u for BP and gout; r/s  
 [address]  
[phone]  
  
   
 [address] [place] [address] [address] [address] Upcoming Health Maintenance Date Due DTaP/Tdap/Td series (1 - Tdap) 2008 Influenza Age 5 to Adult 2018 Allergies as of 2018  Review Complete On: 2018 By: Elizabeth Hughes MD  
 No Known Allergies Current Immunizations  Never Reviewed No immunizations on file. Not reviewed this visit You Were Diagnosed With   
  
 Codes Comments Essential hypertension    -  Primary ICD-10-CM: I10 
ICD-9-CM: 401.9 Idiopathic chronic gout of multiple sites without tophus     ICD-10-CM: M1A. 98U0 ICD-9-CM: 274.02 Chewing tobacco use     ICD-10-CM: Z72.0 ICD-9-CM: 305.1 Vitals BP Pulse Temp Resp Height(growth percentile) Weight(growth percentile) (!) 150/105 65 98 °F (36.7 °C) (Oral) 20 5' 8\" (1.727 m) 215 lb (97.5 kg) BMI Smoking Status 32.69 kg/m2 Former Smoker Vitals History BMI and BSA Data Body Mass Index Body Surface Area  
 32.69 kg/m 2 2.16 m 2 Preferred Pharmacy Pharmacy Name Phone [id number] Mod[place] [address] [facility] Your Updated Medication List  
  
   
 This list is accurate as of 7/5/18  4:20 PM.  Always use your most recent med list. amLODIPine 5 mg tablet Commonly known as:  Vonlaurencee [place] Take 1 Tab by mouth daily. colchicine 0.6 mg tablet One PO now, repeat in 2 hours, then one PO QD  
  
 hydrOXYzine pamoate 25 mg capsule Commonly known as:  VISTARIL  
TAKE ONE CAPSULE BY MOUTH TWICE A DAY AS NEEDED FOR ANXIETY  
  
 omeprazole 20 mg capsule Commonly known as:  PRILOSEC  
TAKE ONE CAPSULE BY MOUTH TWICE A DAY **INS COVERS 1/DAY*  
  
 OTHER Apple Cider Vinegar Tablets  
  
 traMADol 50 mg tablet Commonly known as:  ULTRAM  
Take 1 Tab by mouth every six (6) hours as needed for Pain. Max Daily Amount: 200 mg. Indications: Pain Follow-up Instructions Return in about 4 weeks (around 8/2/2018) for recheck bp, discuss gout. To-Do List   
 q2m Lab:  URIC ACID Patient Instructions Chewing tobacco use Recommend patient quit--discussed risk of oral/pharyngeal ca in addition to esoph/gastric ca. Pt agreeable and receptive Essential hypertension On recheck today bp still elevated, recommend start amlodipine, can return 1m for tolerance check Idiopathic gout of multiple sites Check uric acid, recommend restart allopurinal and titrate to goal of <6 Pt agreeable C/w diet and hydration Introducing [facility] & HEALTH SERVICES! Dear Kathi Burnette: Thank you for requesting a [employer] account. Our records indicate that you already have an active [employer] account. You can access your account anytime at [email or web address] Did you know that you can access your hospital and ER discharge instructions at any time in [employer]? You can also review all of your test results from your hospital stay or ER visit. Additional Information If you have questions, please visit the Frequently Asked Questions section of the [employer] website at [email or web address] Remember, MyChart is NOT to be used for urgent needs. For medical emergencies, dial 911. Now available from your iPhone and Android! Please provide this summary of care documentation to your next provider. Your primary care clinician is listed as Brendan Jensen. If you have any questions after today's visit, please call [phone].

## 2018-07-05 NOTE — ASSESSMENT & PLAN NOTE
Recommend patient quit--discussed risk of oral/pharyngeal ca in addition to esoph/gastric ca.  Pt agreeable and receptive

## 2018-07-05 NOTE — PROGRESS NOTES
Reviewed record in preparation for visit and have obtained necessary documentation. Identified pt with two pt identifiers(name and ). 1. Have you been to the ER, urgent care clinic since your last visit? Hospitalized since your last visit? No    2. Have you seen or consulted any other health care providers outside of the [address] [place] [address] since your last visit? Include any pap smears or colon screening.  No

## 2018-07-05 NOTE — PATIENT INSTRUCTIONS
Chewing tobacco use  Recommend patient quit--discussed risk of oral/pharyngeal ca in addition to esoph/gastric ca.  Pt agreeable and receptive    Essential hypertension  On recheck today bp still elevated, recommend start amlodipine, can return 1m for tolerance check    Idiopathic gout of multiple sites  Check uric acid, recommend restart allopurinal and titrate to goal of <6  Pt agreeable  C/w diet and hydration

## 2018-07-06 LAB — URATE SERPL-MCNC: 9.6 MG/DL (ref 3.7–8.6)

## 2018-07-13 ENCOUNTER — TELEPHONE (OUTPATIENT)
Dept: FAMILY MEDICINE CLINIC | Age: 33
End: 2018-07-13

## 2018-07-13 RX ORDER — ALLOPURINOL 100 MG/1
TABLET ORAL
Qty: 60 TAB | Refills: 3 | Status: SHIPPED | OUTPATIENT
Start: 2018-07-13 | End: 2018-11-08

## 2018-07-13 NOTE — TELEPHONE ENCOUNTER
----- Message from Montana Arguello MD sent at 7/13/2018  9:03 AM EDT -----  Pls let pt know uric acid (gout causing) is high and we should absolutely restart a suppressive medicine with a goal of getting this number below 6, I'd like to get him started and then have him recheck lab in 3 m, he can use slip I gave at last appt to get the lab done mid-October or we can mail him a new copy.

## 2018-07-13 NOTE — PROGRESS NOTES
Pls let pt know uric acid (gout causing) is high and we should absolutely restart a suppressive medicine with a goal of getting this number below 6, I'd like to get him started and then have him recheck lab in 3 m, he can use slip I gave at last appt to get the lab done mid-October or we can mail him a new copy.

## 2018-07-13 NOTE — TELEPHONE ENCOUNTER
Called and spoke with pt, and he has been advised and states understanding of results and plan. Pt states he will  lab slips today. Lab slips have been mailed to pt.

## 2018-07-23 ENCOUNTER — TELEPHONE (OUTPATIENT)
Dept: FAMILY MEDICINE CLINIC | Age: 33
End: 2018-07-23

## 2018-07-23 NOTE — TELEPHONE ENCOUNTER
Pt called to advise his pharmacist had no record of refill sent for Allopurinol when he went to pick it up. Advised pt this was sent electronically on 7/13/18, but will call in to his [facility] pharmacy. Pt agreed to plan and prescription was called in to pharmacist per orders 7/23/18.  Natalia

## 2018-08-14 ENCOUNTER — OFFICE VISIT (OUTPATIENT)
Dept: FAMILY MEDICINE CLINIC | Age: 33
End: 2018-08-14

## 2018-08-14 VITALS
HEART RATE: 60 BPM | BODY MASS INDEX: 33.62 KG/M2 | HEIGHT: 68 IN | DIASTOLIC BLOOD PRESSURE: 72 MMHG | RESPIRATION RATE: 20 BRPM | TEMPERATURE: 98.5 F | WEIGHT: 221.8 LBS | OXYGEN SATURATION: 99 % | SYSTOLIC BLOOD PRESSURE: 136 MMHG

## 2018-08-14 DIAGNOSIS — M1A.09X0 IDIOPATHIC CHRONIC GOUT OF MULTIPLE SITES WITHOUT TOPHUS: Primary | ICD-10-CM

## 2018-08-14 DIAGNOSIS — I10 ESSENTIAL HYPERTENSION: ICD-10-CM

## 2018-08-14 DIAGNOSIS — K21.9 GASTROESOPHAGEAL REFLUX DISEASE WITHOUT ESOPHAGITIS: ICD-10-CM

## 2018-08-14 DIAGNOSIS — Z72.0 CHEWING TOBACCO USE: ICD-10-CM

## 2018-08-14 RX ORDER — ACETAMINOPHEN 500 MG
1 TABLET ORAL DAILY
Qty: 1 KIT | Refills: 0 | Status: SHIPPED | OUTPATIENT
Start: 2018-08-14

## 2018-08-14 RX ORDER — OMEPRAZOLE 20 MG/1
20 CAPSULE, DELAYED RELEASE ORAL DAILY
Qty: 60 CAP | Refills: 6 | Status: SHIPPED | OUTPATIENT
Start: 2018-08-14 | End: 2018-11-06 | Stop reason: SDUPTHER

## 2018-08-14 NOTE — PROGRESS NOTES
Family Medicine Follow-Up Progress Note  Patient: Kalie Leone  1985, 29 y.o., male  Encounter Date: 2018    ASSESSMENT & PLAN  Essential hypertension  BP back to goal, encouraged compliance with meds, healthy diet and exercise, quit chewing tobacco    Chewing tobacco use  You should quit, discussed at last visit as well    GERD (gastroesophageal reflux disease)  Taking omeprazole daily, well controlled, refill provided to patient    Idiopathic gout of multiple sites  Drink plenty of water, now back on allopurinal, given lab slip today for recheck before next visit, may need to uptitrate with goal <6      Orders Placed This Encounter    URIC ACID     Standing Status:   Future     Standing Expiration Date:   2019    Blood Pressure Test Kit-Large kit     Si Device by Does Not Apply route daily. Dispense:  1 Kit     Refill:  0    omeprazole (PRILOSEC) 20 mg capsule     Sig: Take 1 Cap by mouth daily. Indications: gastroesophageal reflux disease     Dispense:  60 Cap     Refill:  6         ICD-10-CM ICD-9-CM    1. Idiopathic chronic gout of multiple sites without tophus M1A. 09X0 274.02 URIC ACID   2. Essential hypertension I10 401.9    3. Chewing tobacco use Z72.0 305.1    4. Gastroesophageal reflux disease without esophagitis K21.9 530.81        CHIEF COMPLAINT  Chief Complaint   Patient presents with    Follow-up    Hypertension       SUBJECTIVE  Kalie Leone is a 29 y.o. male presenting today for follow up of chronic issues  HTN: taking medication, no side effects, feeling well, tolerating. He denies any headaches or blurry vision, no leg swelling, no syncope or near syncope  Gout: feels he might be having some flare up, restarted allopurinol and taking as Rx, trying to be mindful of diet but last night had casserole with cream of mushroom soup which causes flares at times, will monitor and agrees for labs before next visit.  He is trying to drink more water  GErD: has been taking omeprazole for years, tolerates with out side effects, requesting refill, generally does well with 20 before breakfast, sometimes if going to have Raj or tomato based meal will take second daily dose. Understands risk of bone mineralization, is exercising to try to counter act that  Tobacco: still using chewing tobacco, not yet ready to quit. Review of Systems   Constitutional: Negative for chills and fever. HENT: Negative. Eyes: Negative for visual disturbance. Respiratory: Negative for shortness of breath. Cardiovascular: Negative for chest pain and leg swelling. Gastrointestinal: Negative for constipation, diarrhea, nausea and vomiting. Endocrine: Negative for polydipsia, polyphagia and polyuria. Genitourinary: Negative for difficulty urinating. Musculoskeletal: Negative for arthralgias and myalgias. Skin: Negative for rash. Neurological: Negative for seizures, syncope and headaches. Psychiatric/Behavioral:        At Baseline, stable   All other systems reviewed and are negative. OBJECTIVE  Visit Vitals    /72    Pulse 60    Temp 98.5 °F (36.9 °C) (Oral)    Resp 20    Ht 5' 8\" (1.727 m)    Wt 221 lb 12.8 oz (100.6 kg)    SpO2 99%    BMI 33.72 kg/m2       Physical Exam   Constitutional: He is oriented to person, place, and time. He appears well-developed and well-nourished. No distress. NAD, Nontoxic, Appears Stated Age   HENT:   Head: Normocephalic and atraumatic. Mouth/Throat: Oropharynx is clear and moist.   Eyes: Conjunctivae and EOM are normal. Right eye exhibits no discharge. Left eye exhibits no discharge. No scleral icterus. Neck: Neck supple. Cardiovascular: Normal rate, regular rhythm and normal heart sounds. No murmur heard. Pulmonary/Chest: Breath sounds normal. No stridor. No respiratory distress. Abdominal: Soft. Bowel sounds are normal. He exhibits no distension. There is no tenderness.    Musculoskeletal: He exhibits no edema or tenderness. Neurological: He is alert and oriented to person, place, and time. No cranial nerve deficit. Grossly intact CN   Skin: Skin is warm and dry. No rash noted. He is not diaphoretic. Psychiatric: He has a normal mood and affect. His behavior is normal.   Nursing note and vitals reviewed. No results found for any visits on 08/14/18.     HISTORICAL  Reviewed and updated today, and as noted below:    Past Medical History:   Diagnosis Date    Chewing tobacco use 2/22/2018    Essential hypertension 6/20/2018    GERD (gastroesophageal reflux disease)     Idiopathic gout of multiple sites 2/25/2018    Migraine with aura and without status migrainosus, not intractable 2/22/2018     Past Surgical History:   Procedure Laterality Date    HX ORTHOPAEDIC Left 2011    tibia surgery and torn mencius     Family History   Problem Relation Age of Onset    Elevated Lipids Mother     Depression Mother     Anxiety Mother     Migraines Mother     Pacemaker Paternal Grandfather     Elevated Lipids Father     Hypertension Father     Migraines Father     Coronary Artery Disease Paternal Uncle     Heart Attack Paternal Uncle     Asthma Brother     Asthma Brother     No Known Problems Daughter      History   Smoking Status    Former Smoker   Smokeless Tobacco    Current User    Types: Chew     Comment: once every 2 days     Social History     Social History    Marital status: SINGLE     Spouse name: N/A    Number of children: N/A    Years of education: N/A     Social History Main Topics    Smoking status: Former Smoker    Smokeless tobacco: Current User     Types: Chew      Comment: once every 2 days    Alcohol use 1.2 oz/week     2 Cans of beer per week      Comment: 2-3 drinks per night    Drug use: No    Sexual activity: Yes     Partners: Female     Birth control/ protection: None     Other Topics Concern    None     Social History Narrative     No Known Allergies    No visits with results within 3 Month(s) from this visit. Latest known visit with results is:    Orders Only on 03/08/2018   Component Date Value Ref Range Status    Uric acid 07/05/2018 9.6* 3.7 - 8.6 mg/dL Final               Therapeutic target for gout patients: <6.0         Stephanie Kennedy MD  [facility]  08/14/18 4:02 PM    Portions of this note may have been populated using smart dictation software and may have \"sounds-like\" errors present. Pt was counseled on risks, benefits and alternatives of treatment options. All questions were asked and answered and the patient was agreeable with the treatment plan as outlined.

## 2018-08-14 NOTE — PATIENT INSTRUCTIONS
Essential hypertension  BP back to goal, encouraged compliance with meds, healthy diet and exercise, quit chewing tobacco    Chewing tobacco use  You should quit, discussed at last visit as well    GERD (gastroesophageal reflux disease)  Taking omeprazole daily, well controlled, refill provided to patient    Idiopathic gout of multiple sites  Drink plenty of water, now back on allopurinal, given lab slip today for recheck before next visit, may need to uptitrate with goal <6

## 2018-08-14 NOTE — MR AVS SNAPSHOT
[address] 
[phone] Patient: Candice Knapp MRN: [id number] :1985 Visit Information Date & Time Provider Department Dept. Phone Encounter #  
 2018  3:45 PM Keke Sorto Kevinchrisreese [address] [id number] Follow-up Instructions Return in about 3 months (around 2018). Upcoming Health Maintenance Date Due DTaP/Tdap/Td series (1 - Tdap) 2008 Influenza Age 5 to Adult 2018 Allergies as of 2018  Review Complete On: 2018 By: Lefty Blair LPN No Known Allergies Current Immunizations  Never Reviewed No immunizations on file. Not reviewed this visit You Were Diagnosed With   
  
 Codes Comments Idiopathic chronic gout of multiple sites without tophus    -  Primary ICD-10-CM: M1A. 92Y5 ICD-9-CM: 274.02 Essential hypertension     ICD-10-CM: I10 
ICD-9-CM: 401.9 Chewing tobacco use     ICD-10-CM: Z72.0 ICD-9-CM: 305.1 Gastroesophageal reflux disease without esophagitis     ICD-10-CM: K21.9 ICD-9-CM: 530.81 Vitals BP Pulse Temp Resp Height(growth percentile) Weight(growth percentile) 136/72 60 98.5 °F (36.9 °C) (Oral) 20 5' 8\" (1.727 m) 221 lb 12.8 oz (100.6 kg) SpO2 BMI Smoking Status 99% 33.72 kg/m2 Former Smoker Vitals History BMI and BSA Data Body Mass Index Body Surface Area 33.72 kg/m 2 2.2 m 2 Preferred Pharmacy Pharmacy Name Phone [id number] [place] [address] [facility] Your Updated Medication List  
  
   
This list is accurate as of 18  4:06 PM.  Always use your most recent med list.  
  
  
  
  
 allopurinol 100 mg tablet Commonly known as:  Acquanetta Bunk One tablet daily for one week then 2 tablets daily thereafter  Indications: prevention of acute gout attack amLODIPine 5 mg tablet Commonly known as:  Marcellus Presser Take 1 Tab by mouth daily. Blood Pressure Test Kit-Large Kit 1 Device by Does Not Apply route daily. colchicine 0.6 mg tablet One PO now, repeat in 2 hours, then one PO QD  
  
 hydrOXYzine pamoate 25 mg capsule Commonly known as:  VISTARIL  
TAKE ONE CAPSULE BY MOUTH TWICE A DAY AS NEEDED FOR ANXIETY  
  
 omeprazole 20 mg capsule Commonly known as:  PRILOSEC Take 1 Cap by mouth daily. Indications: gastroesophageal reflux disease OTHER Apple Cider Vinegar Tablets  
  
 traMADol 50 mg tablet Commonly known as:  ULTRAM  
Take 1 Tab by mouth every six (6) hours as needed for Pain. Max Daily Amount: 200 mg. Indications: Pain Prescriptions Sent to Pharmacy Refills Blood Pressure Test Kit-Large kit 0 Si Device by Does Not Apply route daily. Class: Normal  
 Pharmacy: [address] [place] [address] #: [phone] Route: Does Not Apply  
 omeprazole (PRILOSEC) 20 mg capsule 6 Sig: Take 1 Cap by mouth daily. Indications: gastroesophageal reflux disease Class: Normal  
 Pharmacy: [address] [place] [address] #: [phone] Route: Oral  
  
Follow-up Instructions Return in about 3 months (around 2018). To-Do List   
 10/23/2018 Lab:  URIC ACID Introducing [facility]! Dear Leon Gerardo: Thank you for requesting a PicBadges account. Our records indicate that you already have an active PicBadges account. You can access your account anytime at [email or web address]. [email or web address] Did you know that you can access your hospital and ER discharge instructions at any time in PicBadges? You can also review all of your test results from your hospital stay or ER visit. Additional Information If you have questions, please visit the Frequently Asked Questions section of the Snappy shuttle website at [email or web address]. "Hey, Neighbor!". [employer]/mychart/. Remember, Snappy shuttle is NOT to be used for urgent needs. For medical emergencies, dial 911. Now available from your iPhone and Android! Please provide this summary of care documentation to your next provider. Your primary care clinician is listed as Anya Ricks. If you have any questions after today's visit, please call [phone].

## 2018-08-14 NOTE — ASSESSMENT & PLAN NOTE
Drink plenty of water, now back on allopurinal, given lab slip today for recheck before next visit, may need to uptitrate with goal <6

## 2018-08-17 DIAGNOSIS — M10.09 ACUTE IDIOPATHIC GOUT OF MULTIPLE SITES: ICD-10-CM

## 2018-08-17 DIAGNOSIS — I10 ESSENTIAL HYPERTENSION: ICD-10-CM

## 2018-08-17 RX ORDER — AMLODIPINE BESYLATE 5 MG/1
TABLET ORAL
Qty: 30 TAB | Refills: 1 | Status: SHIPPED | OUTPATIENT
Start: 2018-08-17 | End: 2018-10-12 | Stop reason: SDUPTHER

## 2018-08-17 RX ORDER — COLCHICINE 0.6 MG/1
TABLET ORAL
Qty: 31 TAB | Refills: 5 | Status: SHIPPED | OUTPATIENT
Start: 2018-08-17 | End: 2018-11-12 | Stop reason: SDUPTHER

## 2018-10-12 DIAGNOSIS — I10 ESSENTIAL HYPERTENSION: ICD-10-CM

## 2018-10-23 DIAGNOSIS — M1A.09X0 IDIOPATHIC CHRONIC GOUT OF MULTIPLE SITES WITHOUT TOPHUS: ICD-10-CM

## 2018-10-23 NOTE — TELEPHONE ENCOUNTER
Second refill request from [facility] pharmacy for pt of Dr. Stepan Daniels. Initial request 10/12/18.  [employer]ymarm

## 2018-10-24 RX ORDER — AMLODIPINE BESYLATE 5 MG/1
TABLET ORAL
Qty: 90 TAB | Refills: 3 | Status: SHIPPED | OUTPATIENT
Start: 2018-10-24 | End: 2019-10-13 | Stop reason: SDUPTHER

## 2018-11-08 DIAGNOSIS — M1A.09X0 IDIOPATHIC CHRONIC GOUT OF MULTIPLE SITES WITHOUT TOPHUS: Primary | ICD-10-CM

## 2018-11-08 RX ORDER — OMEPRAZOLE 20 MG/1
20 CAPSULE, DELAYED RELEASE ORAL DAILY
Qty: 90 CAP | Refills: 1 | Status: SHIPPED | OUTPATIENT
Start: 2018-11-08 | End: 2019-05-15 | Stop reason: SDUPTHER

## 2018-11-08 RX ORDER — ALLOPURINOL 100 MG/1
200 TABLET ORAL DAILY
Qty: 60 TAB | Refills: 1 | Status: SHIPPED | OUTPATIENT
Start: 2018-11-08 | End: 2019-08-06 | Stop reason: SDUPTHER

## 2018-11-08 RX ORDER — ALLOPURINOL 100 MG/1
TABLET ORAL
Qty: 60 TAB | Refills: 3 | Status: CANCELLED | OUTPATIENT
Start: 2018-11-08

## 2018-11-12 DIAGNOSIS — M10.09 ACUTE IDIOPATHIC GOUT OF MULTIPLE SITES: ICD-10-CM

## 2018-11-14 RX ORDER — COLCHICINE 0.6 MG/1
0.6 TABLET ORAL DAILY
Qty: 90 TAB | Refills: 1 | Status: SHIPPED | OUTPATIENT
Start: 2018-11-14 | End: 2020-02-05 | Stop reason: SDUPTHER

## 2019-05-17 NOTE — TELEPHONE ENCOUNTER
Pt called to check on status of refill request for omeprazole sent 2 days ago by [facility] pharmacy.   Natalia

## 2019-05-19 RX ORDER — OMEPRAZOLE 20 MG/1
CAPSULE, DELAYED RELEASE ORAL
Qty: 90 CAP | Refills: 3 | Status: SHIPPED | OUTPATIENT
Start: 2019-05-19 | End: 2020-05-12

## 2019-08-04 DIAGNOSIS — M1A.09X0 IDIOPATHIC CHRONIC GOUT OF MULTIPLE SITES WITHOUT TOPHUS: ICD-10-CM

## 2019-08-06 ENCOUNTER — OFFICE VISIT (OUTPATIENT)
Dept: FAMILY MEDICINE CLINIC | Age: 34
End: 2019-08-06

## 2019-08-06 VITALS
SYSTOLIC BLOOD PRESSURE: 131 MMHG | OXYGEN SATURATION: 100 % | HEIGHT: 68 IN | DIASTOLIC BLOOD PRESSURE: 88 MMHG | RESPIRATION RATE: 18 BRPM | WEIGHT: 218.3 LBS | TEMPERATURE: 98 F | BODY MASS INDEX: 33.08 KG/M2 | HEART RATE: 60 BPM

## 2019-08-06 DIAGNOSIS — M10.09 ACUTE IDIOPATHIC GOUT OF MULTIPLE SITES: Primary | ICD-10-CM

## 2019-08-06 DIAGNOSIS — M1A.09X0 IDIOPATHIC CHRONIC GOUT OF MULTIPLE SITES WITHOUT TOPHUS: ICD-10-CM

## 2019-08-06 DIAGNOSIS — I10 ESSENTIAL HYPERTENSION: ICD-10-CM

## 2019-08-06 DIAGNOSIS — E78.1 HYPERTRIGLYCERIDEMIA: ICD-10-CM

## 2019-08-06 RX ORDER — METHYLPREDNISOLONE 4 MG/1
TABLET ORAL
Qty: 1 DOSE PACK | Refills: 0 | Status: SHIPPED | OUTPATIENT
Start: 2019-08-06 | End: 2020-01-23 | Stop reason: SDUPTHER

## 2019-08-06 RX ORDER — ALLOPURINOL 100 MG/1
200 TABLET ORAL DAILY
Qty: 60 TAB | Refills: 0 | Status: SHIPPED | OUTPATIENT
Start: 2019-08-06 | End: 2019-08-07 | Stop reason: SDUPTHER

## 2019-08-06 NOTE — PROGRESS NOTES
Family Medicine Acute Visit Progress Note  Patient: Alison Layne  1985, 34 y.o., male  Encounter Date: 8/6/2019    ASSESSMENT & PLAN    ICD-10-CM ICD-9-CM    1. Acute idiopathic gout of multiple sites M10.09 274.01 CBC WITH AUTOMATED DIFF      METABOLIC PANEL, COMPREHENSIVE      URIC ACID   2. Idiopathic chronic gout of multiple sites without tophus M1A. 09X0 274.02 allopurinol (ZYLOPRIM) 100 mg tablet   3. Hypertriglyceridemia E78.1 272.1 CBC WITH AUTOMATED DIFF      METABOLIC PANEL, COMPREHENSIVE      LIPID PANEL       Orders Placed This Encounter    CBC WITH AUTOMATED DIFF    METABOLIC PANEL, COMPREHENSIVE    URIC ACID    LIPID PANEL    methylPREDNISolone (MEDROL, JACOB,) 4 mg tablet     Sig: Use as directed     Dispense:  1 Dose Pack     Refill:  0    allopurinol (ZYLOPRIM) 100 mg tablet     Sig: Take 2 Tabs by mouth daily. Indications: treatment to prevent acute gout attack     Dispense:  60 Tab     Refill:  0       Patient Instructions   Suspect gout flare with underlying inflammation causing limited range of motion  Continue with current medications  Go for labs  Steroids as prescribed  If worsening or changing seek care  May warrant visit to orthopedist if range of motion or pain do not improve in the time course we would expect with gout        CHIEF COMPLAINT  Chief Complaint   Patient presents with    Knee Pain     Left knee swelling and warn to touch. Rosas Johnson is a 34 y.o. male presenting today for left knee pain. He has a history of left knee instrumentation status post meniscal repair and also repair of Osgood slaughters and he has a chronic effusion of this left knee.   He reports about 3 days ago he developed a red hot warm and swollen knee, it appeared to be exactly as his other gout flares had been in the past, the patient has idiopathic familial gout  He started taking his colchicine and it seemed to calm down the swelling and the redness however he has a limited range of motion and some crepitus in the knee that has not improved and so he wanted to ensure that there is nothing else going on because he is leaving for the beach in several days  He reports moderate pain but significantly improved from when the swelling and pain initially began  He is overdue for labs, they were ordered but never completed in the past  He reports that he is going to run out of his allopurinol here soon  Otherwise he reports compliance with his blood pressure medication with no side effects  History of hypertriglyceridemia almost 500 not on treatment presently, no signs or symptoms of pancreatitis per patient report  He is taking over-the-counter omeprazole and is tolerating it well  No systemic signs of infection, denies fevers or chills, no Reiger's, no spreading redness on his leg or knee  No disparity in calf size  Review of Systems  A 12 point review of systems was negative except as noted here or in the HPI. OBJECTIVE  Visit Vitals  /88 (BP 1 Location: Left arm, BP Patient Position: Sitting)   Pulse 60   Temp 98 °F (36.7 °C) (Oral)   Resp 18   Ht 5' 8\" (1.727 m)   Wt 218 lb 4.8 oz (99 kg)   SpO2 100%   BMI 33.19 kg/m²       Physical Exam   Constitutional: He is oriented to person, place, and time. He appears well-developed and well-nourished. No distress. NAD, Nontoxic, Appears Stated Age   HENT:   Head: Normocephalic and atraumatic. Mouth/Throat: Oropharynx is clear and moist.   Eyes: Conjunctivae and EOM are normal. Right eye exhibits no discharge. Left eye exhibits no discharge. No scleral icterus. Neck: Neck supple. Cardiovascular: Normal rate, regular rhythm and normal heart sounds. No murmur heard. Pulmonary/Chest: Breath sounds normal. No stridor. No respiratory distress. Abdominal: Soft. Bowel sounds are normal. He exhibits no distension. There is no tenderness. Musculoskeletal: He exhibits edema. He exhibits no tenderness.    Left anterior shin defect with overlying edema consistent with Osgood slaughters, no tenderness at medial or lateral joint lines, crepitus pronounced laterally with range of motion of left knee, limited range of motion due to pain and swelling, specifically limited extension   Neurological: He is alert and oriented to person, place, and time. No cranial nerve deficit. Grossly intact CN   Skin: Skin is warm and dry. No rash noted. He is not diaphoretic. No cellulitis, no color or texture changes to skin bilateral knees appear similar and no change in skin temperature noted either   Psychiatric: He has a normal mood and affect. His behavior is normal.   Nursing note and vitals reviewed. No results found for any visits on 08/06/19. HISTORICAL  PMH, PSH, FHX, SOCHX, ALLERGIES and MES were reviewed and updated today. Julieta Zepeda MD  [facility]  08/06/19 5:23 PM    Portions of this note may have been populated using smart dictation software and may have \"sounds-like\" errors present. Pt was counseled on risks, benefits and alternatives of treatment options. All questions were asked and answered and the patient was agreeable with the treatment plan as outlined.

## 2019-08-06 NOTE — PATIENT INSTRUCTIONS
Suspect gout flare with underlying inflammation causing limited range of motion Continue with current medications Go for labs Steroids as prescribed If worsening or changing seek care May warrant visit to orthopedist if range of motion or pain do not improve in the time course we would expect with gout

## 2019-08-06 NOTE — PROGRESS NOTES
Chief Complaint   Patient presents with    Knee Pain     Left knee swelling and warn to touch. 1. Have you been to the ER, urgent care clinic since your last visit? Hospitalized since your last visit? No    2. Have you seen or consulted any other health care providers outside of the [address] [place] [address] since your last visit? Include any pap smears or colon screening.  No

## 2019-08-07 RX ORDER — ALLOPURINOL 100 MG/1
TABLET ORAL
Qty: 60 TAB | Refills: 1 | Status: SHIPPED | OUTPATIENT
Start: 2019-08-07 | End: 2019-08-09 | Stop reason: SDUPTHER

## 2019-08-08 ENCOUNTER — TELEPHONE (OUTPATIENT)
Dept: FAMILY MEDICINE CLINIC | Age: 34
End: 2019-08-08

## 2019-08-08 LAB
ALBUMIN SERPL-MCNC: 4.6 G/DL (ref 3.5–5.5)
ALBUMIN/GLOB SERPL: 1.5 {RATIO} (ref 1.2–2.2)
ALP SERPL-CCNC: 90 IU/L (ref 39–117)
ALT SERPL-CCNC: 25 IU/L (ref 0–44)
AST SERPL-CCNC: 18 IU/L (ref 0–40)
BASOPHILS # BLD AUTO: 0 X10E3/UL (ref 0–0.2)
BASOPHILS NFR BLD AUTO: 0 %
BILIRUB SERPL-MCNC: 0.4 MG/DL (ref 0–1.2)
BUN SERPL-MCNC: 13 MG/DL (ref 6–20)
BUN/CREAT SERPL: 13 (ref 9–20)
CALCIUM SERPL-MCNC: 9.8 MG/DL (ref 8.7–10.2)
CHLORIDE SERPL-SCNC: 100 MMOL/L (ref 96–106)
CHOLEST SERPL-MCNC: 184 MG/DL (ref 100–199)
CO2 SERPL-SCNC: 21 MMOL/L (ref 20–29)
CREAT SERPL-MCNC: 1 MG/DL (ref 0.76–1.27)
EOSINOPHIL # BLD AUTO: 0.2 X10E3/UL (ref 0–0.4)
EOSINOPHIL NFR BLD AUTO: 3 %
ERYTHROCYTE [DISTWIDTH] IN BLOOD BY AUTOMATED COUNT: 14 % (ref 12.3–15.4)
GLOBULIN SER CALC-MCNC: 3.1 G/DL (ref 1.5–4.5)
GLUCOSE SERPL-MCNC: 84 MG/DL (ref 65–99)
HCT VFR BLD AUTO: 46.4 % (ref 37.5–51)
HDLC SERPL-MCNC: 41 MG/DL
HGB BLD-MCNC: 15.6 G/DL (ref 13–17.7)
IMM GRANULOCYTES # BLD AUTO: 0 X10E3/UL (ref 0–0.1)
IMM GRANULOCYTES NFR BLD AUTO: 0 %
INTERPRETATION, 910389: NORMAL
LDLC SERPL CALC-MCNC: 103 MG/DL (ref 0–99)
LYMPHOCYTES # BLD AUTO: 2.5 X10E3/UL (ref 0.7–3.1)
LYMPHOCYTES NFR BLD AUTO: 37 %
MCH RBC QN AUTO: 31.5 PG (ref 26.6–33)
MCHC RBC AUTO-ENTMCNC: 33.6 G/DL (ref 31.5–35.7)
MCV RBC AUTO: 94 FL (ref 79–97)
MONOCYTES # BLD AUTO: 0.7 X10E3/UL (ref 0.1–0.9)
MONOCYTES NFR BLD AUTO: 10 %
NEUTROPHILS # BLD AUTO: 3.3 X10E3/UL (ref 1.4–7)
NEUTROPHILS NFR BLD AUTO: 50 %
PLATELET # BLD AUTO: 297 X10E3/UL (ref 150–450)
POTASSIUM SERPL-SCNC: 4.6 MMOL/L (ref 3.5–5.2)
PROT SERPL-MCNC: 7.7 G/DL (ref 6–8.5)
RBC # BLD AUTO: 4.96 X10E6/UL (ref 4.14–5.8)
SODIUM SERPL-SCNC: 143 MMOL/L (ref 134–144)
TRIGL SERPL-MCNC: 200 MG/DL (ref 0–149)
URATE SERPL-MCNC: 6.1 MG/DL (ref 3.7–8.6)
VLDLC SERPL CALC-MCNC: 40 MG/DL (ref 5–40)
WBC # BLD AUTO: 6.7 X10E3/UL (ref 3.4–10.8)

## 2019-08-08 NOTE — TELEPHONE ENCOUNTER
----- Message from Norman Dawson sent at 8/8/2019  1:03 PM EDT -----  Regarding: Dr Alexander/telephone  Patient would like to know if there test results are back yet from his visit on 8/6/19 orders for Lab Core ? Pt can be reached at [phone], pt gave permission to leave a message  With the test results.

## 2019-08-09 DIAGNOSIS — M1A.09X0 IDIOPATHIC CHRONIC GOUT OF MULTIPLE SITES WITHOUT TOPHUS: ICD-10-CM

## 2019-08-09 RX ORDER — ALLOPURINOL 300 MG/1
300 TABLET ORAL DAILY
Qty: 90 TAB | Refills: 3 | Status: SHIPPED | OUTPATIENT
Start: 2019-08-09 | End: 2020-08-10

## 2019-08-09 NOTE — TELEPHONE ENCOUNTER
Called and spoke with pt, and he has been advised and states understanding of results and recommendation per EpiEPt message from Dr. Roge Flanagan. Pt agrees to plan and will  increased medication.

## 2019-08-09 NOTE — PROGRESS NOTES
Labs reviewed, blood count normal, electrolytes, kidney and liver function normal, uric acid just slightly above goal at 6.1, we would ideally like to see this below 6  The elevated triglycerides are much improved from prior check but still slightly elevated and LDL cholesterol is 103 which is very near goal  Continue to eat a high-fiber diet and be mindful of limiting foods that contain saturated fats to help to modify this  No signs of infection on these labs  We should increase allopurinol slightly to 300 daily, I will send this to your pharmacy  Please contact my office if you have any questions or concerns

## 2019-09-04 DIAGNOSIS — M1A.09X0 IDIOPATHIC CHRONIC GOUT OF MULTIPLE SITES WITHOUT TOPHUS: ICD-10-CM

## 2019-09-04 RX ORDER — ALLOPURINOL 100 MG/1
200 TABLET ORAL DAILY
Qty: 60 TAB | Refills: 0 | OUTPATIENT
Start: 2019-09-04

## 2019-09-04 NOTE — TELEPHONE ENCOUNTER
Allopurinol 200 declined because dose is 300 and Rx for 300 was sent in August, please recheck with patient that he is taking the correct dose and he has enough of this medication

## 2019-09-04 NOTE — TELEPHONE ENCOUNTER
Called and spoke with pt. Per patient he is taking Allopurinol 300 Mg daily. Pt states he has the correct RX and no additional medication needed.  Pt states his RX was changed from 200 Mg to 300 Mg.

## 2019-10-13 DIAGNOSIS — I10 ESSENTIAL HYPERTENSION: ICD-10-CM

## 2019-10-13 RX ORDER — AMLODIPINE BESYLATE 5 MG/1
TABLET ORAL
Qty: 90 TAB | Refills: 3 | Status: SHIPPED | OUTPATIENT
Start: 2019-10-13 | End: 2020-08-18 | Stop reason: SDUPTHER

## 2020-01-23 ENCOUNTER — TELEPHONE (OUTPATIENT)
Dept: FAMILY MEDICINE CLINIC | Age: 35
End: 2020-01-23

## 2020-01-23 RX ORDER — METHYLPREDNISOLONE 4 MG/1
TABLET ORAL
Qty: 1 DOSE PACK | Refills: 0 | Status: SHIPPED | OUTPATIENT
Start: 2020-01-23 | End: 2020-08-18

## 2020-01-23 NOTE — TELEPHONE ENCOUNTER
After verifying patient's ID, advised patient per Dr. Belgica Harvey no appointments available, she will send a medrol dose pack to his pharmacy, if symptoms worsen or change over the weekend he should go to urgent care or return to the practice to be seen next week. Patient verbalized understanding and states no other questions.

## 2020-01-23 NOTE — TELEPHONE ENCOUNTER
No appointments available with me for visit. Pt known to have idiopathic gout, on allopurinol. Will send medrol dose norah and have patient be seen if worsening/changing symptoms next week or uc over weekend.   pls notify patient

## 2020-01-23 NOTE — TELEPHONE ENCOUNTER
----- Message from Judi Burns sent at 1/23/2020  8:33 AM EST -----  Regarding: Dr. Scruggs Slight (if not patient):      Relationship of caller (if not patient):      Best contact number(s):[phone]      Name of medication and dosage if known: Yessica Suero      Is patient out of this medication (yes/no):      Pharmacy name:[facility] in [address] [facility] listed in chart? (yes/no):yes  Pharmacy phone number:      Details to clarify the request: Pt stated his gout has flared up in his (R) foot and requested a Z-norah called to the pharmacy today, if poss.       Judi Burns

## 2020-01-24 DIAGNOSIS — M10.09 ACUTE IDIOPATHIC GOUT OF MULTIPLE SITES: ICD-10-CM

## 2020-01-29 RX ORDER — COLCHICINE 0.6 MG/1
0.6 TABLET ORAL DAILY
Qty: 90 TAB | Refills: 1 | OUTPATIENT
Start: 2020-01-29

## 2020-02-05 DIAGNOSIS — M10.09 ACUTE IDIOPATHIC GOUT OF MULTIPLE SITES: ICD-10-CM

## 2020-02-06 RX ORDER — COLCHICINE 0.6 MG/1
0.6 TABLET ORAL DAILY
Qty: 270 TAB | Refills: 1 | Status: SHIPPED | OUTPATIENT
Start: 2020-02-06

## 2020-05-12 RX ORDER — OMEPRAZOLE 20 MG/1
CAPSULE, DELAYED RELEASE ORAL
Qty: 90 CAP | Refills: 0 | Status: SHIPPED | OUTPATIENT
Start: 2020-05-12 | End: 2020-08-18 | Stop reason: SDUPTHER

## 2020-08-10 DIAGNOSIS — M1A.09X0 IDIOPATHIC CHRONIC GOUT OF MULTIPLE SITES WITHOUT TOPHUS: ICD-10-CM

## 2020-08-10 RX ORDER — ALLOPURINOL 300 MG/1
TABLET ORAL
Qty: 30 TAB | Refills: 0 | Status: SHIPPED | OUTPATIENT
Start: 2020-08-10 | End: 2020-08-18 | Stop reason: SDUPTHER

## 2020-08-10 NOTE — TELEPHONE ENCOUNTER
Please notify pt he is overdue for a visit.  Please schedule him  30d supply of medication provided until he is seen

## 2020-08-18 ENCOUNTER — VIRTUAL VISIT (OUTPATIENT)
Dept: FAMILY MEDICINE CLINIC | Age: 35
End: 2020-08-18
Payer: COMMERCIAL

## 2020-08-18 DIAGNOSIS — I10 ESSENTIAL HYPERTENSION: ICD-10-CM

## 2020-08-18 DIAGNOSIS — M1A.09X0 IDIOPATHIC CHRONIC GOUT OF MULTIPLE SITES WITHOUT TOPHUS: ICD-10-CM

## 2020-08-18 DIAGNOSIS — I10 ESSENTIAL HYPERTENSION: Primary | ICD-10-CM

## 2020-08-18 DIAGNOSIS — K21.9 GASTROESOPHAGEAL REFLUX DISEASE, ESOPHAGITIS PRESENCE NOT SPECIFIED: ICD-10-CM

## 2020-08-18 PROCEDURE — 99214 OFFICE O/P EST MOD 30 MIN: CPT | Performed by: FAMILY MEDICINE

## 2020-08-18 RX ORDER — OMEPRAZOLE 20 MG/1
CAPSULE, DELAYED RELEASE ORAL
Qty: 90 CAP | Refills: 4 | Status: SHIPPED | OUTPATIENT
Start: 2020-08-18 | End: 2021-10-24

## 2020-08-18 RX ORDER — AMLODIPINE BESYLATE 5 MG/1
TABLET ORAL
Qty: 30 TAB | Refills: 0 | Status: SHIPPED | OUTPATIENT
Start: 2020-08-18 | End: 2020-11-03 | Stop reason: SDUPTHER

## 2020-08-18 RX ORDER — ALLOPURINOL 300 MG/1
TABLET ORAL
Qty: 30 TAB | Refills: 0 | Status: SHIPPED | OUTPATIENT
Start: 2020-08-18 | End: 2020-09-07

## 2020-08-18 NOTE — PROGRESS NOTES
Chief Complaint   Patient presents with    Hypertension     Follow up    Gout   1. Have you been to the ER, urgent care clinic since your last visit? Hospitalized since your last visit? No    2. Have you seen or consulted any other health care providers outside of the [address] [place] [address] since your last visit? Include any pap smears or colon screening.  No

## 2020-08-18 NOTE — PROGRESS NOTES
Millie Rodriguez is a 30 y.o. male who was seen by synchronous (real-time) audio-video technology on 8/18/2020. Assessment & Plan:   Diagnoses and all orders for this visit:    1. Essential hypertension  -     METABOLIC PANEL, BASIC; Future  -     amLODIPine (NORVASC) 5 mg tablet; TAKE 1 TABLET BY MOUTH EVERY DAY    2. Idiopathic chronic gout of multiple sites without tophus  -     URIC ACID; Future  -     allopurinoL (ZYLOPRIM) 300 mg tablet; TAKE 1 TABLET BY MOUTH EVERY DAY    3. Gastroesophageal reflux disease, esophagitis presence not specified  -     omeprazole (PRILOSEC) 20 mg capsule; TAKE 1 CAPSULE BY MOUTH EVERY DAY        Unknown BP   Asymptomatic gout and gastroesophageal reflux disease  Labs per orders. Continue current plans. Refills per orders  He is going to call me in the next week or so with his blood pressure    Follow-up and Dispositions    · Return pending BP result. Reviewed plan of care. Patient has provided input and agrees with goals. CPT Codes 60190-53259 for Established Patients may apply to this Telehealth Visit      Subjective:   Millie Rodriguez was seen for Hypertension (Follow up) and Gout      Millie Rodriguez is here to follow up on their HTN. This is a chronic problem. The problem occurs constantly and is ? [place] Angry The symptoms are relieved by Norvasc, which is/are working ? [place] No gout symptoms. Also, he needs refills on his Prilosec for his gastroesophageal reflux disease. He takes this regularly and is not having any symptoms. Review of Systems   Constitutional: Negative for weight loss. No weight gain   Eyes: Negative for blurred vision. Respiratory: Negative for shortness of breath. Cardiovascular: Negative for chest pain and leg swelling. Gastrointestinal: Negative for abdominal pain, heartburn, nausea and vomiting. Neurological: Negative for dizziness, sensory change, speech change, focal weakness and headaches. Objective:     Physical Exam  Constitutional:       General: He is not in acute distress. Appearance: Normal appearance. Neurological:      Mental Status: He is alert and oriented to person, place, and time. Due to this being a TeleHealth evaluation, many elements of the physical examination are unable to be assessed. We discussed the expected course, resolution and complications of the diagnosis(es) in detail. Medication risks, benefits, costs, interactions, and alternatives were discussed as indicated. I advised him to contact the office if his condition worsens, changes or fails to improve as anticipated. He expressed understanding with the diagnosis(es) and plan. Pursuant to the emergency declaration under the [facility], [facility] waiver authority and the [employer] Act, this Virtual  Visit was conducted, with patient's consent, to reduce the patient's risk of exposure to COVID-19 and provide continuity of care for an established patient. Services were provided through a video synchronous discussion virtually to substitute for in-person clinic visit.     Anton Prcie MD

## 2020-09-03 DIAGNOSIS — M1A.09X0 IDIOPATHIC CHRONIC GOUT OF MULTIPLE SITES WITHOUT TOPHUS: ICD-10-CM

## 2020-09-07 RX ORDER — ALLOPURINOL 300 MG/1
TABLET ORAL
Qty: 30 TAB | Refills: 0 | Status: SHIPPED | OUTPATIENT
Start: 2020-09-07 | End: 2020-10-05 | Stop reason: SDUPTHER

## 2020-09-19 LAB
BUN SERPL-MCNC: 13 MG/DL (ref 6–20)
BUN/CREAT SERPL: 13 (ref 9–20)
CALCIUM SERPL-MCNC: 9.9 MG/DL (ref 8.7–10.2)
CHLORIDE SERPL-SCNC: 102 MMOL/L (ref 96–106)
CO2 SERPL-SCNC: 24 MMOL/L (ref 20–29)
CREAT SERPL-MCNC: 0.97 MG/DL (ref 0.76–1.27)
GLUCOSE SERPL-MCNC: 94 MG/DL (ref 65–99)
POTASSIUM SERPL-SCNC: 4.3 MMOL/L (ref 3.5–5.2)
SODIUM SERPL-SCNC: 140 MMOL/L (ref 134–144)
URATE SERPL-MCNC: 4.2 MG/DL (ref 3.7–8.6)

## 2020-10-05 DIAGNOSIS — M1A.09X0 IDIOPATHIC CHRONIC GOUT OF MULTIPLE SITES WITHOUT TOPHUS: ICD-10-CM

## 2020-10-05 RX ORDER — ALLOPURINOL 300 MG/1
TABLET ORAL
Qty: 30 TAB | Refills: 0 | Status: SHIPPED | OUTPATIENT
Start: 2020-10-05 | End: 2020-11-05

## 2020-11-03 DIAGNOSIS — M1A.09X0 IDIOPATHIC CHRONIC GOUT OF MULTIPLE SITES WITHOUT TOPHUS: ICD-10-CM

## 2020-11-03 DIAGNOSIS — I10 ESSENTIAL HYPERTENSION: ICD-10-CM

## 2020-11-03 NOTE — LETTER
11/6/2020 9:52 AM 
 
Mr. Heather Cm [address] [facility] [id number] Dear  Edel Cindy: 
 
Delmis Alvarez missed you! Please call our office at [phone] and schedule a follow up appointment for your continued care, we are offering virtual appointments as well. We look forward to seeing you soon.   
 
 
 
Sincerely, 
 
 
Evgeny Singer MD

## 2020-11-05 RX ORDER — AMLODIPINE BESYLATE 5 MG/1
TABLET ORAL
Qty: 30 TAB | Refills: 0 | Status: SHIPPED | OUTPATIENT
Start: 2020-11-05 | End: 2020-12-22 | Stop reason: SDUPTHER

## 2020-11-05 RX ORDER — ALLOPURINOL 300 MG/1
TABLET ORAL
Qty: 90 TAB | Refills: 3 | Status: SHIPPED | OUTPATIENT
Start: 2020-11-05 | End: 2021-10-24

## 2020-12-22 DIAGNOSIS — I10 ESSENTIAL HYPERTENSION: ICD-10-CM

## 2020-12-28 RX ORDER — AMLODIPINE BESYLATE 5 MG/1
TABLET ORAL
Qty: 30 TAB | Refills: 0 | Status: SHIPPED | OUTPATIENT
Start: 2020-12-28 | End: 2021-01-27 | Stop reason: SDUPTHER

## 2021-01-20 DIAGNOSIS — I10 ESSENTIAL HYPERTENSION: ICD-10-CM

## 2021-01-21 RX ORDER — AMLODIPINE BESYLATE 5 MG/1
TABLET ORAL
Qty: 30 TAB | Refills: 0 | OUTPATIENT
Start: 2021-01-21

## 2021-01-22 NOTE — TELEPHONE ENCOUNTER
Called pt, and left a voice message, that he/she is due for their follow up appointment, here at [facility]. Advised pt to call the office back to schedule their appointment.

## 2021-01-27 ENCOUNTER — VIRTUAL VISIT (OUTPATIENT)
Dept: FAMILY MEDICINE CLINIC | Age: 36
End: 2021-01-27
Payer: COMMERCIAL

## 2021-01-27 DIAGNOSIS — I10 ESSENTIAL HYPERTENSION: Primary | ICD-10-CM

## 2021-01-27 DIAGNOSIS — E78.1 HYPERTRIGLYCERIDEMIA: ICD-10-CM

## 2021-01-27 DIAGNOSIS — M1A.09X0 IDIOPATHIC CHRONIC GOUT OF MULTIPLE SITES WITHOUT TOPHUS: ICD-10-CM

## 2021-01-27 PROCEDURE — 99214 OFFICE O/P EST MOD 30 MIN: CPT | Performed by: FAMILY MEDICINE

## 2021-01-27 RX ORDER — AMLODIPINE BESYLATE 10 MG/1
TABLET ORAL
Qty: 90 TAB | Refills: 1 | Status: SHIPPED | OUTPATIENT
Start: 2021-01-27 | End: 2021-07-21

## 2021-01-27 NOTE — PROGRESS NOTES
Reji Hernández is a 37 y.o. male who was seen by synchronous (real-time) audio-video technology on 1/27/2021. Consent: Reji Hernández, who was seen by synchronous (real-time) audio-video technology, and/or his healthcare decision maker, is aware that this patient-initiated, Telehealth encounter on 1/27/2021 is a billable service, with coverage as determined by his insurance carrier. He is aware that he may receive a bill and has provided verbal consent to proceed: Yes. Assessment & Plan:   1. Essential hypertension  bp not at goal  Increase amlodipine from 5 to 10 mg    2. Idiopathic chronic gout of multiple sites without tophus  No recent flare ups    3. Hypertriglyceridemia  Has been trying hard to up his fiber, lower carb diet  Will be due for a recheck with next lab work          Pt was counseled on risks, benefits and alternatives of treatment options. All questions were asked and answered and the patient was agreeable with the treatment plan as outlined. Subjective:   Reji Hernández is a 37 y.o. male who was seen for Hypertension (Follow up) and Medication Refill      HTN: patient reports stable on medications. No chest pain, sob, headache, blurred vision, leg swelling, diaphoresis, falls. Compliant with medications as prescribed. is checking blood pressures at home and reports range is 140s/100s. No significant hyper or hypotensive episodes that the patient reports today. Less exercise with covid and less working out, weight hasn't really changed the patient reports but he thinks his body composition has changed    Allopurinol is working well     Medications, allergies, PMH, PSH, SOCH, ESTUARDO SP[facility]ER OF [facility] reviewed and updated per routine protocol, see chart for review and changes if not noted here. ROS  A 12 point review of systems was negative except as noted here or in the HPI.     Objective:   Vital Signs: (As obtained by patient/caregiver at home)  Patient-Reported Vitals 1/27/2021   Patient-Reported Weight 218lb   Patient-Reported Height -   Patient-Reported Systolic  841   Patient-Reported Diastolic 98        [INSTRUCTIONS:  \"[x]\" Indicates a positive item  \"[]\" Indicates a negative item  -- DELETE ALL ITEMS NOT EXAMINED]    Constitutional: [x] Appears well-developed and well-nourished [x] No apparent distress      [] Abnormal -     Mental status: [x] Alert and awake  [x] Oriented to person/place/time [x] Able to follow commands    [] Abnormal -     Eyes:   EOM    [x]  Normal    [] Abnormal -   Sclera  [x]  Normal    [] Abnormal -          Discharge [x]  None visible   [] Abnormal -     HENT: [x] Normocephalic, atraumatic  [] Abnormal -   [x] Mouth/Throat: Mucous membranes are moist    External Ears [x] Normal  [] Abnormal -    Neck: [x] No visualized mass [] Abnormal -     Pulmonary/Chest: [x] Respiratory effort normal   [x] No visualized signs of difficulty breathing or respiratory distress        [] Abnormal -      Musculoskeletal:   [x] Normal gait with no signs of ataxia         [x] Normal range of motion of neck        [] Abnormal -     Neurological:        [x] No Facial Asymmetry (Cranial nerve 7 motor function) (limited exam due to video visit)          [x] No gaze palsy        [] Abnormal -          Skin:        [x] No significant exanthematous lesions or discoloration noted on facial skin         [] Abnormal -            Psychiatric:       [x] Normal Affect [] Abnormal -        [x] No Hallucinations    Other pertinent observable physical exam findings:ambulating, no distress, non toxic, well appearing, wearing glasses    We discussed the expected course, resolution and complications of the diagnosis(es) in detail. Medication risks, benefits, costs, interactions, and alternatives were discussed as indicated. I advised him to contact the office if his condition worsens, changes or fails to improve as anticipated. He expressed understanding with the diagnosis(es) and plan.        Halima Monroe is a 37 y.o. male who was evaluated by a video visit encounter for concerns as above. Patient identification was verified prior to start of the visit. A caregiver was present when appropriate. Due to this being a TeleHealth encounter (During [facility]-99 public health emergency), evaluation of the following organ systems was limited: Vitals/Constitutional/EENT/Resp/CV/GI//MS/Neuro/Skin/Heme-Lymph-Imm. Pursuant to the emergency declaration under the [address] [place] [address], [facility] waiver authority and the [employer] and [employer] Act, this Virtual  Visit was conducted, with patient's (and/or legal guardian's) consent, to reduce the patient's risk of exposure to COVID-19 and provide necessary medical care. Services were provided through a video synchronous discussion virtually to substitute for in-person clinic visit. Patient and provider were located at their individual homes. Mary Gregory MD  [facility]  01/27/21 1:28 PM     Portions of this note may have been populated using smart dictation software and may have \"sounds-like\" errors present.

## 2021-01-27 NOTE — PROGRESS NOTES
Chief Complaint   Patient presents with    Hypertension     Follow up    Medication Refill     1. Have you been to the ER, urgent care clinic since your last visit? Hospitalized since your last visit? No    2. Have you seen or consulted any other health care providers outside of the [address] [place] [address] since your last visit? Include any pap smears or colon screening.  No

## 2021-07-21 DIAGNOSIS — I10 ESSENTIAL HYPERTENSION: ICD-10-CM

## 2021-07-21 RX ORDER — AMLODIPINE BESYLATE 10 MG/1
TABLET ORAL
Qty: 90 TABLET | Refills: 1 | Status: SHIPPED | OUTPATIENT
Start: 2021-07-21 | End: 2022-01-25

## 2021-09-07 ENCOUNTER — VIRTUAL VISIT (OUTPATIENT)
Dept: FAMILY MEDICINE CLINIC | Age: 36
End: 2021-09-07
Payer: COMMERCIAL

## 2021-09-07 DIAGNOSIS — R53.83 FATIGUE, UNSPECIFIED TYPE: ICD-10-CM

## 2021-09-07 DIAGNOSIS — I10 ESSENTIAL HYPERTENSION: ICD-10-CM

## 2021-09-07 DIAGNOSIS — E66.9 OBESITY WITHOUT SERIOUS COMORBIDITY, UNSPECIFIED CLASSIFICATION, UNSPECIFIED OBESITY TYPE: ICD-10-CM

## 2021-09-07 DIAGNOSIS — R06.83 SNORING: Primary | ICD-10-CM

## 2021-09-07 PROCEDURE — 99212 OFFICE O/P EST SF 10 MIN: CPT | Performed by: FAMILY MEDICINE

## 2021-09-07 NOTE — PROGRESS NOTES
Georges Chan is a 37 y.o. male who was seen by synchronous (real-time) audio-video technology on 9/7/2021. Assessment & Plan:   Diagnoses and all orders for this visit:    1. Snoring  -     SLEEP MEDICINE REFERRAL    2. Fatigue, unspecified type  -     SLEEP MEDICINE REFERRAL    3. Essential hypertension  -     SLEEP MEDICINE REFERRAL    4. Obesity without serious comorbidity, unspecified classification, unspecified obesity type  -     SLEEP MEDICINE REFERRAL        Likely obstructive sleep apnea  Referral for sleep study    Follow-up and Dispositions    · Return if symptoms worsen or fail to improve. Reviewed plan of care. Patient has provided input and agrees with goals. CPT Codes 18422-12028 for Established Patients may apply to this Telehealth Visit      Subjective:   Georges Chan was seen for Sleep Problem      Patient presents with:  Sleep Problem    He is concerned he has sleep apnea. His dentist is also concerned. He snores a lot and gasps a lot at night. There is also daytime fatigue. He is obese and has HTN. Review of Systems   Constitutional: Positive for malaise/fatigue. Negative for weight loss. No weight gain. Psychiatric/Behavioral: The patient does not have insomnia. Objective:   /85    Physical Exam  Constitutional:       General: He is not in acute distress. Appearance: Normal appearance. He is obese. Neurological:      Mental Status: He is alert and oriented to person, place, and time. Due to this being a TeleHealth evaluation, many elements of the physical examination are unable to be assessed. We discussed the expected course, resolution and complications of the diagnosis(es) in detail. Medication risks, benefits, costs, interactions, and alternatives were discussed as indicated. I advised him to contact the office if his condition worsens, changes or fails to improve as anticipated.  He expressed understanding with the diagnosis(es) and plan. Pursuant to the emergency declaration under the [facility], [facility] waiver authority and the [employer] and [employer] Act, this Virtual  Visit was conducted, with patient's consent, to reduce the patient's risk of exposure to COVID-19 and provide continuity of care for an established patient. Services were provided through a video synchronous discussion virtually to substitute for in-person clinic visit.     Tobi Hope MD

## 2021-09-07 NOTE — PROGRESS NOTES
Chief Complaint   Patient presents with    Sleep Problem     1. Have you been to the ER, urgent care clinic since your last visit? Hospitalized since your last visit? No    2. Have you seen or consulted any other health care providers outside of the [address] [place] [address] since your last visit? Include any pap smears or colon screening.  No

## 2021-10-20 ENCOUNTER — OFFICE VISIT (OUTPATIENT)
Dept: SLEEP MEDICINE | Age: 36
End: 2021-10-20
Payer: COMMERCIAL

## 2021-10-20 VITALS
BODY MASS INDEX: 33.8 KG/M2 | DIASTOLIC BLOOD PRESSURE: 86 MMHG | SYSTOLIC BLOOD PRESSURE: 139 MMHG | OXYGEN SATURATION: 99 % | HEIGHT: 68 IN | WEIGHT: 223 LBS | HEART RATE: 70 BPM

## 2021-10-20 DIAGNOSIS — G47.33 OSA (OBSTRUCTIVE SLEEP APNEA): Primary | ICD-10-CM

## 2021-10-20 PROCEDURE — 99204 OFFICE O/P NEW MOD 45 MIN: CPT | Performed by: SPECIALIST

## 2021-10-20 RX ORDER — LISINOPRIL 20 MG/1
TABLET ORAL DAILY
COMMUNITY
End: 2022-07-29

## 2021-10-20 NOTE — PATIENT INSTRUCTIONS
Learning About Sleep Apnea  What is it? Sleep apnea means that breathing stops for short periods during sleep. When you stop breathing or have reduced airflow into your lungs during sleep, you don't sleep well and you can be very tired during the day. The oxygen levels in your blood may go down, and carbon dioxide levels go up. It may lead to other problems, such as high blood pressure and heart disease. Sleep apnea can range from mild to severe, based on how often breathing stops during sleep. For adults, breathing may stop as few as 5 times an hour (mild apnea) to 30 or more times an hour (severe apnea). Obstructive sleep apnea is the most common type. This most often occurs because your airways are blocked or partly blocked. Central sleep apnea is less common. It happens when the brain has trouble controlling breathing. Some people have both types. That's called complex sleep apnea. What are the symptoms? There are symptoms of sleep apnea that you may notice and symptoms that others may notice when you're asleep. Symptoms you may notice include:  · Feeling extremely sleepy during the day. · Feeling unrefreshed or tired after a night's sleep. · Problems with memory and concentration, or mood changes. · Morning headaches. · Getting up often during the night to urinate. · A dry mouth or sore throat in the morning. If you have a bed partner, they may notice that you:  · Have episodes of not breathing. · Snore loudly. Almost all people who have sleep apnea snore. But not all people who snore have sleep apnea. · Toss and turn during sleep. · Have nighttime choking or gasping spells. How is it diagnosed? Your doctor will probably do a physical exam and ask about your past health. The doctor may also ask you or your bed partner about your snoring and sleep behavior and how tired you feel during the day. Your doctor may suggest a sleep study.  Sleep studies are a series of tests that look at what happens to the body during sleep. They check for how often you stop breathing or have too little air flowing into your lungs during sleep. They also find out how much oxygen you have in your blood during sleep. A sleep study may take place in your home. Or it might take place at a sleep center, where you will spend the night. If your sleep apnea doesn't improve with treatment, you may have more tests to find out what's causing it. How is it treated? You may be able to help treat sleep apnea by making some lifestyle changes. You could try to lose weight, sleep on your side, and avoid alcohol and medicines like sedatives before bed. Sleep apnea is often treated with machines that deliver air through a mask to help keep your airways open. These include:  · Continuous positive airway pressure (CPAP). This increases air pressure in your throat. It keeps your airway open when you breathe in. It's the most common device. · Bilevel positive airway pressure (BPAP). You may also hear this called BiPAP. This uses different air pressures when you breathe in and out. · Adaptive servo ventilation (ASV). It senses pauses in breathing and adjusts air pressure. It's mostly used for central sleep apnea. You can also try oral breathing devices or nasal devices. If your tonsils or other tissues are blocking your airway, your doctor may suggest surgery to open the airway. How can you care for yourself at home? · Lose weight, if needed. · Sleep on your side. It may help mild apnea. · Avoid alcohol and medicines such as sleeping pills, opioids, or sedatives before bed. · Don't smoke. If you need help quitting, talk to your doctor. · Prop up the head of your bed. · Treat breathing problems, such as a stuffy nose, that are caused by a cold or allergies. · Try a continuous positive airway pressure (CPAP) breathing machine if your doctor recommends it.   · If CPAP doesn't work for you, ask your doctor if you can try other masks, settings, or breathing machines. · Try oral breathing devices or other nasal devices. · Talk to your doctor if your nose feels dry or bleeds, or if it gets runny or stuffy when you use a breathing machine. · Tell your doctor if you're sleepy during the day and it affects your daily life. Don't drive or operate machinery when you're drowsy. Where can you learn more? Go to http://www.gray.com/  Enter S121 in the search box to learn more about \"Learning About Sleep Apnea. \"  Current as of: July 6, 2021               Content Version: 13.0  © 0489-6965 Healthwise, [employer]. Care instructions adapted under license by [employer] (which disclaims liability or warranty for this information). If you have questions about a medical condition or this instruction, always ask your healthcare professional. [address] any warranty or liability for your use of this information.

## 2021-10-20 NOTE — PROGRESS NOTES
[address] [facility]. [place], [address]  Tel.  [phone]  Fax. 100 [facility] 60  [place], [address] [facility]  Tel.  [phone]  Fax. [phone] [address]   Tel.  [phone]  Fax. [phone]       Chief Complaint       Chief Complaint   Patient presents with    Sleep Problem     f2f NP: refd by Dr. Janelle BROWNE      Leilani López is 37 y.o. male seen for evaluation of a sleep disorder. He has a history of snoring described as loud. Snoring more prominent when supine. Often associated with a snort or gasping respirations. He will normally retires 11: 30 p.m. and will get a bed at 6 AM.  He may awaken several times during the night. He does have young children. He notes sleepwalking. He denies sleep talking, bruxism or nocturnal incontinence, abnormal arm or leg movements, hypnagogic hallucinations, sleep paralysis or cataplexy. The patient has not undergone diagnostic testing for the current problems. Wright Sleepiness Score: 8       No Known Allergies    Current Outpatient Medications   Medication Sig Dispense Refill    lisinopriL (PRINIVIL, ZESTRIL) 20 mg tablet Take  by mouth daily.  allopurinoL (ZYLOPRIM) 300 mg tablet TAKE 1 TABLET BY MOUTH EVERY DAY 90 Tab 3    omeprazole (PRILOSEC) 20 mg capsule TAKE 1 CAPSULE BY MOUTH EVERY DAY 90 Cap 4    Blood Pressure Test Kit-Large kit 1 Device by Does Not Apply route daily. 1 Kit 0    OTHER Apple Cider Vinegar Tablets      amLODIPine (NORVASC) 10 mg tablet TAKE 1 TABLET BY MOUTH EVERY DAY (Patient not taking: Reported on 10/20/2021) 90 Tablet 1    colchicine 0.6 mg tablet Take 1 Tab by mouth daily.  (Patient not taking: Reported on 10/20/2021) 270 Tab 1        He  has a past medical history of Chewing tobacco use (2/22/2018), Essential hypertension (6/20/2018), GERD (gastroesophageal reflux disease), Idiopathic gout of multiple sites (2/25/2018), and Migraine with aura and without status migrainosus, not intractable (2/22/2018). He  has a past surgical history that includes hx orthopaedic (Left, 2011). He family history includes Anxiety in his mother; Asthma in his brother and brother; Coronary Artery Disease in his paternal uncle; Depression in his mother; Elevated Lipids in his father and mother; Heart Attack in his paternal uncle; Hypertension in his father; Migraines in his father and mother; No Known Problems in his daughter; Pacemaker in his paternal grandfather. He  reports that he has quit smoking. His smokeless tobacco use includes chew. He reports current alcohol use of about 2.0 standard drinks of alcohol per week. He reports that he does not use drugs. Review of Systems:  Review of Systems   Constitutional: Negative for chills and fever. HENT: Negative for hearing loss and tinnitus. Eyes: Negative for blurred vision and double vision. Respiratory: Negative for cough and shortness of breath. Cardiovascular: Negative for chest pain and palpitations. Gastrointestinal: Negative for abdominal pain and heartburn. Genitourinary: Negative for frequency and urgency. Musculoskeletal: Negative for back pain and neck pain. Skin: Negative for itching and rash. Neurological: Negative for dizziness and headaches. Psychiatric/Behavioral: Negative for depression. The patient is not nervous/anxious. Objective:     Visit Vitals  /86 (BP 1 Location: Left arm, BP Patient Position: Sitting, BP Cuff Size: Adult)   Pulse 70   Ht 5' 8\" (1.727 m)   Wt 223 lb (101.2 kg)   SpO2 99%   BMI 33.91 kg/m²     Body mass index is 33.91 kg/m². General:   Conversant, cooperative   Eyes:  Pupils equal and reactive, no nystagmus   Oropharynx:   Mallampati score IV,  tongue scalloped, uvula prominent narrow posterior oral airway       Neck:   No carotid bruits;      Chest/Lungs:  Clear on auscultation CVS:  Normal rate, regular rhythm   Skin:  Warm to touch; no obvious rashes   Neuro:  Speech fluent, face symmetrical, tongue movement normal   Psych:  Normal affect,  normal countenance        Assessment:       ICD-10-CM ICD-9-CM    1. JESSICA (obstructive sleep apnea)  G47.33 327.23 SLEEP STUDY UNATTENDED, 4 CHANNEL     History consistent with sleep disordered breathing. Patient does have a narrow posterior oral airway. Potentially, sleep breathing abnormalities more prominent when supine, and/or in rem sleep. Will be evaluated with a home sleep test.      Plan:     Orders Placed This Encounter    SLEEP STUDY UNATTENDED, 4 CHANNEL     Order Specific Question:   Reason for Exam     Answer:   snoring       * Patient has a history and examination consistent with the diagnosis of sleep apnea. *Home sleep testing was ordered for initial evaluation. * He was provided information on sleep apnea including corresponding risk factors and the importance of proper treatment. * Treatment options if indicated were reviewed today. Instructions:  o Do not engage in activities requiring a normal degree of alertness if fatigue is present. o The patient understands that untreated or undertreated sleep apnea could impair judgement and the ability to function normally during the day.  o Call or return if symptoms worsen or persist.          Naeem Mandel MD, FAASM  Electronically signed 10/20/21       This note was created using voice recognition software. Despite editing, there may be syntax errors. This note will not be viewable in [address]

## 2021-10-22 DIAGNOSIS — K21.9 GASTROESOPHAGEAL REFLUX DISEASE: ICD-10-CM

## 2021-10-22 DIAGNOSIS — M1A.09X0 IDIOPATHIC CHRONIC GOUT OF MULTIPLE SITES WITHOUT TOPHUS: ICD-10-CM

## 2021-10-24 RX ORDER — OMEPRAZOLE 20 MG/1
CAPSULE, DELAYED RELEASE ORAL
Qty: 90 CAPSULE | Refills: 3 | Status: SHIPPED | OUTPATIENT
Start: 2021-10-24 | End: 2022-10-23

## 2021-10-24 RX ORDER — ALLOPURINOL 300 MG/1
TABLET ORAL
Qty: 90 TABLET | Refills: 3 | Status: SHIPPED | OUTPATIENT
Start: 2021-10-24 | End: 2022-10-23

## 2021-11-01 ENCOUNTER — OFFICE VISIT (OUTPATIENT)
Dept: SLEEP MEDICINE | Age: 36
End: 2021-11-01

## 2021-11-01 ENCOUNTER — HOSPITAL ENCOUNTER (OUTPATIENT)
Dept: SLEEP MEDICINE | Age: 36
Discharge: HOME | End: 2021-11-01

## 2021-11-01 DIAGNOSIS — G47.33 OSA (OBSTRUCTIVE SLEEP APNEA): Primary | ICD-10-CM

## 2021-11-01 NOTE — PROGRESS NOTES
[address] [facility]. [place], [address]  Tel.  [phone]  Fax. 100 [facility] 60  [place], [address] [facility]  Tel.  [phone]  Fax. [phone] [address] [facility] [address]   Tel.  [phone]  Fax. [phone]       S>Malik Beltran Carrier is a 37 y.o. male seen today to receive a home sleep testing unit (HST). · Patient was educated on proper hookup and operation of the HST. · Instruction forms and documentation were reviewed and signed. · The patient demonstrated good understanding of the HST.    O>    There were no vitals taken for this visit. A>  No diagnosis found. P>  · General information regarding operations and maintenance of the device was provided. · He was provided information on sleep apnea including coresponding risk factors and the importance of proper treatment. · Follow-up appointment was made to return the HST. He will be contacted once the results have been reviewed. · He was asked to contact our office for any problems regarding his home sleep test study.    · [id number]

## 2021-11-02 ENCOUNTER — DOCUMENTATION ONLY (OUTPATIENT)
Dept: SLEEP MEDICINE | Age: 36
End: 2021-11-02

## 2021-11-02 NOTE — PROGRESS NOTES
Spoke with Silviano Schaefer with [facility] who states auth request requires pre-auth form. Faxed over [address] form to [facility] on 11/2/2021.

## 2021-11-04 ENCOUNTER — TELEPHONE (OUTPATIENT)
Dept: SLEEP MEDICINE | Age: 36
End: 2021-11-04

## 2021-11-04 DIAGNOSIS — G47.33 OSA (OBSTRUCTIVE SLEEP APNEA): Primary | ICD-10-CM

## 2021-11-04 NOTE — TELEPHONE ENCOUNTER
HSAT demonstrated sleep disordered breathing characterized by an overall AHI of 27.2/h associated with minimal SaO2 of 73%. Supine related AHI of 41/h. Snoring during 56.5% of the study. Recommendation: APAP 7-18 cm    Sleep technologist: Please advise patient of HSAT results. Order has been entered for APAP 7-18 cm. Please schedule first compliance appointment.

## 2021-11-15 ENCOUNTER — DOCUMENTATION ONLY (OUTPATIENT)
Dept: SLEEP MEDICINE | Age: 36
End: 2021-11-15

## 2022-01-18 ENCOUNTER — TELEPHONE (OUTPATIENT)
Dept: SLEEP MEDICINE | Age: 37
End: 2022-01-18

## 2022-01-22 DIAGNOSIS — I10 ESSENTIAL HYPERTENSION: ICD-10-CM

## 2022-01-22 NOTE — LETTER
1/25/2022 2:33 PM    Mr. Jessica Patiño  [phone]      Dear Taya Lamar Emery:    Amelie Spence missed you! Please call our office at [phone] and schedule a follow up appointment for your continued care, with in the next 60 days. I will be unable to continue refilling your medication until you have been seen for an appointment. Look forward to seeing you soon.          Sincerely,      Aminta Obregon MD

## 2022-01-25 RX ORDER — AMLODIPINE BESYLATE 10 MG/1
TABLET ORAL
Qty: 90 TABLET | Refills: 1 | Status: SHIPPED | OUTPATIENT
Start: 2022-01-25 | End: 2022-07-21

## 2022-02-09 ENCOUNTER — PATIENT MESSAGE (OUTPATIENT)
Dept: SLEEP MEDICINE | Age: 37
End: 2022-02-09

## 2022-02-09 ENCOUNTER — DOCUMENTATION ONLY (OUTPATIENT)
Dept: SLEEP MEDICINE | Age: 37
End: 2022-02-09

## 2022-03-18 PROBLEM — M10.09 IDIOPATHIC GOUT OF MULTIPLE SITES: Status: ACTIVE | Noted: 2018-02-25

## 2022-03-19 PROBLEM — K21.9 GERD (GASTROESOPHAGEAL REFLUX DISEASE): Status: ACTIVE | Noted: 2018-02-22

## 2022-03-19 PROBLEM — Z72.0 CHEWING TOBACCO USE: Status: ACTIVE | Noted: 2018-02-22

## 2022-03-19 PROBLEM — I10 ESSENTIAL HYPERTENSION: Status: ACTIVE | Noted: 2018-06-20

## 2022-03-20 PROBLEM — G43.109 MIGRAINE WITH AURA AND WITHOUT STATUS MIGRAINOSUS, NOT INTRACTABLE: Status: ACTIVE | Noted: 2018-02-22

## 2022-06-22 ENCOUNTER — VIRTUAL VISIT (OUTPATIENT)
Dept: FAMILY MEDICINE CLINIC | Age: 37
End: 2022-06-22
Payer: COMMERCIAL

## 2022-06-22 DIAGNOSIS — M1A.09X0 IDIOPATHIC CHRONIC GOUT OF MULTIPLE SITES WITHOUT TOPHUS: ICD-10-CM

## 2022-06-22 DIAGNOSIS — Z99.89 OSA ON CPAP: ICD-10-CM

## 2022-06-22 DIAGNOSIS — I10 ESSENTIAL HYPERTENSION: Primary | ICD-10-CM

## 2022-06-22 DIAGNOSIS — K21.9 GASTROESOPHAGEAL REFLUX DISEASE, UNSPECIFIED WHETHER ESOPHAGITIS PRESENT: ICD-10-CM

## 2022-06-22 DIAGNOSIS — Z98.52 HX OF VASECTOMY: ICD-10-CM

## 2022-06-22 DIAGNOSIS — Z11.59 NEED FOR HEPATITIS C SCREENING TEST: ICD-10-CM

## 2022-06-22 DIAGNOSIS — E78.1 HYPERTRIGLYCERIDEMIA: ICD-10-CM

## 2022-06-22 DIAGNOSIS — G47.33 OSA ON CPAP: ICD-10-CM

## 2022-06-22 PROBLEM — Z72.0 CHEWING TOBACCO USE: Status: RESOLVED | Noted: 2018-02-22 | Resolved: 2022-06-22

## 2022-06-22 PROCEDURE — 99214 OFFICE O/P EST MOD 30 MIN: CPT | Performed by: FAMILY MEDICINE

## 2022-06-22 NOTE — PROGRESS NOTES
VV-Pt is aware of billable appt depending on insurance plan. Preferred number  [phone]  Pt verbally agrees to labs, orders, and others to be mailed to confirmed home address. Identified pt with two pt identifiers(name and ). Reviewed record in preparation for visit and have obtained necessary documentation. All patient medications has been reviewed. Chief Complaint   Patient presents with    Labs     Patient requested labs to be preformed        Health Maintenance Due   Topic    Hepatitis C Screening     DTaP/Tdap/Td series (1 - Tdap)    COVID-19 Vaccine (3 - Booster for Lo Peter series)     Health Maintenance Review: Patient reminded of \"due or due soon\" health maintenance. I have asked the patient to contact his/her primary care provider (PCP) for follow-up on his/her health maintenance. Wt Readings from Last 3 Encounters:   10/20/21 223 lb (101.2 kg)   19 218 lb 4.8 oz (99 kg)   18 221 lb 12.8 oz (100.6 kg)     Temp Readings from Last 3 Encounters:   19 98 °F (36.7 °C) (Oral)   18 98.5 °F (36.9 °C) (Oral)   18 98 °F (36.7 °C) (Oral)     BP Readings from Last 3 Encounters:   10/20/21 139/86   19 131/88   18 136/72     Pulse Readings from Last 3 Encounters:   10/20/21 70   19 60   18 60         Coordination of Care Questionnaire:   1) Have you been to an emergency room, urgent care, or hospitalized since your last visit? No    2. Have seen or consulted any other health care provider since your last visit?  No

## 2022-06-22 NOTE — PROGRESS NOTES
Nancy Valenzuela is a 31 y.o. male who was seen by synchronous (real-time) audio-video technology on 6/22/2022. Consent: Nancy Valenzuela, who was seen by synchronous (real-time) audio-video technology, and/or his healthcare decision maker, is aware that this patient-initiated, Telehealth encounter on 6/22/2022 is a billable service, with coverage as determined by his insurance carrier. He is aware that he may receive a bill and has provided verbal consent to proceed: Yes. Assessment & Plan:   1. Essential hypertension  bp reported to be at goal, c/w medication, labs for monitoring    2. Gastroesophageal reflux disease, unspecified whether esophagitis present  Stable if compliant with PPI, will continue    3. Hypertriglyceridemia  Due for lab recheck    4. Hx of vasectomy  Recently, by [facility] urology    5. Idiopathic chronic gout of multiple sites without tophus  Has not had a recent flare, check labs, stable    6. Need for hepatitis C screening test  routine    7. JESSICA on CPAP  Stable using cpap          Pt was counseled on risks, benefits and alternatives of treatment options. All questions were asked and answered and the patient was agreeable with the treatment plan as outlined. Subjective:   Nancy Valenzuela is a 31 y.o. male who was seen for Labs (Patient requested labs to be preformed )      Pt had sent a message for lab work request  I had recommended a visit so we could get him set up   HTN: patient reports stable on medications. No chest pain, sob, headache, blurred vision, leg swelling, diaphoresis, falls. Compliant with medications as prescribed. Is only sometimes checking blood pressures at home and reports range is normal in general, 130s / 80s. No significant hyper or hypotensive episodes that the patient reports today.     Hasn't had any significant gout issues recently  Drinking 5-6 L of water / day    Diet hasn't been \"wonderful\"  Weight is about the same he tells me  He says he's been working out less   Going on walks a few times a week    Using oral nicotine, not using tobacco products    Drinking alcohol daily--drinking 6 beers / night most of the nights  He intends to cut it back    Acid reflux is much better since getting cpap  Sleep also much better with CPAP    Medications, allergies, PMH, PSH, SOCH, [address] reviewed and updated per routine protocol, see chart for review and changes if not noted here. ROS  A 12 point review of systems was negative except as noted here or in the HPI.     Objective:   Vital Signs: (As obtained by patient/caregiver at home)  Patient-Reported Vitals 6/22/2022   Patient-Reported Weight 228.0lbs   Patient-Reported Height -   Patient-Reported Systolic  095   Patient-Reported Diastolic 85        [INSTRUCTIONS:  \"[x]\" Indicates a positive item  \"[]\" Indicates a negative item  -- DELETE ALL ITEMS NOT EXAMINED]    Constitutional: [x] Appears well-developed and well-nourished [x] No apparent distress      [] Abnormal -     Mental status: [x] Alert and awake  [x] Oriented to person/place/time [x] Able to follow commands    [] Abnormal -     Eyes:   EOM    [x]  Normal    [] Abnormal -   Sclera  [x]  Normal    [] Abnormal -          Discharge [x]  None visible   [] Abnormal -     HENT: [x] Normocephalic, atraumatic  [] Abnormal -   [x] Mouth/Throat: Mucous membranes are moist    External Ears [x] Normal  [] Abnormal -    Neck: [x] No visualized mass [] Abnormal -     Pulmonary/Chest: [x] Respiratory effort normal   [x] No visualized signs of difficulty breathing or respiratory distress        [] Abnormal -      Musculoskeletal:   [] Normal gait with no signs of ataxia         [x] Normal range of motion of neck        [] Abnormal -     Neurological:        [x] No Facial Asymmetry (Cranial nerve 7 motor function) (limited exam due to video visit)          [x] No gaze palsy        [] Abnormal -          Skin:        [x] No significant exanthematous lesions or discoloration noted on facial skin         [] Abnormal -            Psychiatric:       [x] Normal Affect [] Abnormal -        [x] No Hallucinations    Other pertinent observable physical exam findings:outisde, ambulatory, no distress, nontoxic appearing    We discussed the expected course, resolution and complications of the diagnosis(es) in detail. Medication risks, benefits, costs, interactions, and alternatives were discussed as indicated. I advised him to contact the office if his condition worsens, changes or fails to improve as anticipated. He expressed understanding with the diagnosis(es) and plan. Gerry Judd is a 31 y.o. male who was evaluated by a video visit encounter for concerns as above. Patient identification was verified prior to start of the visit. A caregiver was present when appropriate. Due to this being a TeleHealth encounter (During [facility]-03 public health emergency), evaluation of the following organ systems was limited: Vitals/Constitutional/EENT/Resp/CV/GI//MS/Neuro/Skin/Heme-Lymph-Imm. Pursuant to the emergency declaration under the [address] [place] [address], [facility] waiver authority and the [employer] and [employer] Act, this Virtual  Visit was conducted, with patient's (and/or legal guardian's) consent, to reduce the patient's risk of exposure to COVID-19 and provide necessary medical care. Services were provided through a video synchronous discussion virtually to substitute for in-person clinic visit. Patient and provider were located at their individual homes. Neil Sumner MD  [facility]  06/22/22 10:21 AM     Portions of this note may have been populated using smart dictation software and may have \"sounds-like\" errors present.

## 2022-07-19 DIAGNOSIS — E78.1 HYPERTRIGLYCERIDEMIA: ICD-10-CM

## 2022-07-19 DIAGNOSIS — M1A.09X0 IDIOPATHIC CHRONIC GOUT OF MULTIPLE SITES WITHOUT TOPHUS: ICD-10-CM

## 2022-07-19 DIAGNOSIS — Z11.59 NEED FOR HEPATITIS C SCREENING TEST: ICD-10-CM

## 2022-07-19 DIAGNOSIS — I10 ESSENTIAL HYPERTENSION: ICD-10-CM

## 2022-07-19 DIAGNOSIS — K21.9 GASTROESOPHAGEAL REFLUX DISEASE, UNSPECIFIED WHETHER ESOPHAGITIS PRESENT: ICD-10-CM

## 2022-07-20 DIAGNOSIS — I10 ESSENTIAL HYPERTENSION: ICD-10-CM

## 2022-07-20 LAB
ALBUMIN SERPL-MCNC: 4.5 G/DL (ref 3.5–5)
ALBUMIN/GLOB SERPL: 1.2 {RATIO} (ref 1.1–2.2)
ALP SERPL-CCNC: 73 U/L (ref 45–117)
ALT SERPL-CCNC: 142 U/L (ref 12–78)
ANION GAP SERPL CALC-SCNC: 10 MMOL/L (ref 5–15)
AST SERPL-CCNC: 62 U/L (ref 15–37)
BILIRUB SERPL-MCNC: 0.8 MG/DL (ref 0.2–1)
BUN SERPL-MCNC: 12 MG/DL (ref 6–20)
BUN/CREAT SERPL: 11 (ref 12–20)
CALCIUM SERPL-MCNC: 9.9 MG/DL (ref 8.5–10.1)
CHLORIDE SERPL-SCNC: 99 MMOL/L (ref 97–108)
CHOLEST SERPL-MCNC: 236 MG/DL
CO2 SERPL-SCNC: 27 MMOL/L (ref 21–32)
CREAT SERPL-MCNC: 1.05 MG/DL (ref 0.7–1.3)
ERYTHROCYTE [DISTWIDTH] IN BLOOD BY AUTOMATED COUNT: 13 % (ref 11.5–14.5)
GLOBULIN SER CALC-MCNC: 3.7 G/DL (ref 2–4)
GLUCOSE SERPL-MCNC: 97 MG/DL (ref 65–100)
HCT VFR BLD AUTO: 46.5 % (ref 36.6–50.3)
HCV AB SERPL QL IA: NONREACTIVE
HDLC SERPL-MCNC: 41 MG/DL
HDLC SERPL: 5.8 {RATIO} (ref 0–5)
HGB BLD-MCNC: 16 G/DL (ref 12.1–17)
LDLC SERPL CALC-MCNC: ABNORMAL MG/DL (ref 0–100)
LDLC SERPL DIRECT ASSAY-MCNC: 100 MG/DL (ref 0–100)
MCH RBC QN AUTO: 32.3 PG (ref 26–34)
MCHC RBC AUTO-ENTMCNC: 34.4 G/DL (ref 30–36.5)
MCV RBC AUTO: 93.9 FL (ref 80–99)
NRBC # BLD: 0 K/UL (ref 0–0.01)
NRBC BLD-RTO: 0 PER 100 WBC
PLATELET # BLD AUTO: 291 K/UL (ref 150–400)
PMV BLD AUTO: 10 FL (ref 8.9–12.9)
POTASSIUM SERPL-SCNC: 4.2 MMOL/L (ref 3.5–5.1)
PROT SERPL-MCNC: 8.2 G/DL (ref 6.4–8.2)
RBC # BLD AUTO: 4.95 M/UL (ref 4.1–5.7)
SODIUM SERPL-SCNC: 136 MMOL/L (ref 136–145)
TRIGL SERPL-MCNC: 573 MG/DL (ref ?–150)
URATE SERPL-MCNC: 4.9 MG/DL (ref 3.5–7.2)
VLDLC SERPL CALC-MCNC: ABNORMAL MG/DL
WBC # BLD AUTO: 8 K/UL (ref 4.1–11.1)

## 2022-07-21 RX ORDER — AMLODIPINE BESYLATE 10 MG/1
TABLET ORAL
Qty: 90 TABLET | Refills: 3 | Status: SHIPPED | OUTPATIENT
Start: 2022-07-21

## 2022-07-29 ENCOUNTER — OFFICE VISIT (OUTPATIENT)
Dept: FAMILY MEDICINE CLINIC | Age: 37
End: 2022-07-29
Payer: COMMERCIAL

## 2022-07-29 VITALS
DIASTOLIC BLOOD PRESSURE: 78 MMHG | TEMPERATURE: 97 F | OXYGEN SATURATION: 98 % | WEIGHT: 222 LBS | RESPIRATION RATE: 20 BRPM | HEIGHT: 68 IN | HEART RATE: 66 BPM | BODY MASS INDEX: 33.65 KG/M2 | SYSTOLIC BLOOD PRESSURE: 132 MMHG

## 2022-07-29 DIAGNOSIS — I10 ESSENTIAL HYPERTENSION: ICD-10-CM

## 2022-07-29 DIAGNOSIS — E78.1 HYPERTRIGLYCERIDEMIA: Primary | ICD-10-CM

## 2022-07-29 DIAGNOSIS — R79.89 ELEVATED LFTS: ICD-10-CM

## 2022-07-29 PROCEDURE — 99214 OFFICE O/P EST MOD 30 MIN: CPT | Performed by: FAMILY MEDICINE

## 2022-07-29 NOTE — PROGRESS NOTES
Chief Complaint   Patient presents with    Hypertension     Follow up     Results     Discuss lab results

## 2022-07-29 NOTE — PATIENT INSTRUCTIONS
Trigs >500  LFTs elevated  BP came down on recheck    High fiber diet  Exercise 150 / w minimum    Drink water    Limit alcohol    F/up 4-6 mo with labs prior

## 2022-07-29 NOTE — PROGRESS NOTES
Family Medicine Follow-Up Progress Note  Patient: Booker Cushing  1985, 31 y.o., male  Encounter Date: 7/29/2022    ASSESSMENT & PLAN    ICD-10-CM ICD-9-CM    1. Hypertriglyceridemia  E78.1 272.1 HEPATIC FUNCTION PANEL      LIPID PANEL      2. Elevated LFTs  R79.89 790.6 HEPATIC FUNCTION PANEL      3. Essential hypertension  I10 401.9           Orders Placed This Encounter    HEPATIC FUNCTION PANEL     Standing Status:   Future     Standing Expiration Date:   7/29/2023    LIPID PANEL     Standing Status:   Future     Standing Expiration Date:   7/29/2023         Patient Instructions   Trigs >500  LFTs elevated  BP came down on recheck    High fiber diet  Exercise 150 / w minimum    Drink water    Limit alcohol    F/up 4-6 mo with labs prior    CHIEF COMPLAINT  Chief Complaint   Patient presents with    Hypertension     Follow up     Results     Discuss lab results       Angelina Jimenes is a 31 y.o. male presenting today for follow up    HTN: patient reports stable on medications. No chest pain, sob, headache, blurred vision, leg swelling, diaphoresis, falls. Compliant with medications as prescribed. 22 checking blood pressures at home and reports range is mid 130s/80s. No significant hyper or hypotensive episodes that the patient reports today.     No gout flare ups  Conscious of water intake  A few litres of water a day    Elevated LFTs  Wt Readings from Last 3 Encounters:   07/29/22 222 lb (100.7 kg)   10/20/21 223 lb (101.2 kg)   08/06/19 218 lb 4.8 oz (99 kg)     Thinks there has been a significant body composition change--in 2019 he thinks he had about 10% body fat, he was doing a lot of fitness/lifting    Since getting his numbers he started doing 1 h of cardio or lifting a day    Since receiving his labs back has lost 12 lbs    Trying for a high fiber low carb diet        ROS  Review of Systems  A 12 point review of systems was negative except as noted here or in the HPI.    OBJECTIVE  Visit Vitals  /78   Pulse 66   Temp 97 °F (36.1 °C) (Temporal)   Resp 20   Ht 5' 8\" (1.727 m)   Wt 222 lb (100.7 kg)   SpO2 98%   BMI 33.75 kg/m²       Physical Exam  Vitals reviewed. Constitutional:       General: He is not in acute distress. Appearance: Normal appearance. He is normal weight. He is not ill-appearing, toxic-appearing or diaphoretic. HENT:      Head: Normocephalic and atraumatic. Right Ear: External ear normal.      Left Ear: External ear normal.      Mouth/Throat:      Mouth: Mucous membranes are moist.   Eyes:      General: No scleral icterus. Cardiovascular:      Rate and Rhythm: Normal rate and regular rhythm. Heart sounds: No murmur heard. No friction rub. No gallop. Pulmonary:      Effort: Pulmonary effort is normal. No respiratory distress. Breath sounds: No stridor. No wheezing or rhonchi. Musculoskeletal:      Right lower leg: No edema. Left lower leg: No edema. Skin:     Coloration: Skin is not jaundiced or pale. Findings: No bruising, erythema, lesion or rash. Neurological:      General: No focal deficit present. Mental Status: He is alert. Cranial Nerves: No cranial nerve deficit. Gait: Gait normal.   Psychiatric:         Mood and Affect: Mood normal.         Behavior: Behavior normal.         Thought Content: Thought content normal.         Judgment: Judgment normal.       No results found for any visits on 07/29/22.     HISTORICAL  Reviewed and updated today, and as noted below:    Past Medical History:   Diagnosis Date    Chewing tobacco use 2/22/2018    Essential hypertension 6/20/2018    GERD (gastroesophageal reflux disease)     Idiopathic gout of multiple sites 2/25/2018    Migraine with aura and without status migrainosus, not intractable 2/22/2018     Past Surgical History:   Procedure Laterality Date    HX ORTHOPAEDIC Left 2011    tibia surgery and torn mencius     Family History   Problem Relation Age of Onset    Elevated Lipids Mother     Depression Mother     Anxiety Mother     Migraines Mother     Pacemaker Paternal Grandfather     Elevated Lipids Father     Hypertension Father     Migraines Father     Coronary Art Dis Paternal Uncle     Heart Attack Paternal Uncle     Asthma Brother     Asthma Brother     No Known Problems Daughter      Social History     Tobacco Use   Smoking Status Former   Smokeless Tobacco Current    Types: Chew   Tobacco Comments    once every 2 days     Social History     Socioeconomic History    Marital status: SINGLE   Tobacco Use    Smoking status: Former    Smokeless tobacco: Current     Types: Chew    Tobacco comments:     once every 2 days   Vaping Use    Vaping Use: Never used   Substance and Sexual Activity    Alcohol use: Yes     Alcohol/week: 2.0 standard drinks     Types: 2 Cans of beer per week     Comment: 2-3 drinks per night    Drug use: No    Sexual activity: Yes     Partners: Female     Birth control/protection: None     No Known Allergies    LAB REVIEW  Lab Results   Component Value Date/Time    Sodium 136 07/19/2022 11:31 AM    Potassium 4.2 07/19/2022 11:31 AM    Chloride 99 07/19/2022 11:31 AM    CO2 27 07/19/2022 11:31 AM    Anion gap 10 07/19/2022 11:31 AM    Glucose 97 07/19/2022 11:31 AM    BUN 12 07/19/2022 11:31 AM    Creatinine 1.05 07/19/2022 11:31 AM    BUN/Creatinine ratio 11 (L) 07/19/2022 11:31 AM    GFR est AA >60 07/19/2022 11:31 AM    GFR est non-AA >60 07/19/2022 11:31 AM    Calcium 9.9 07/19/2022 11:31 AM    Bilirubin, total 0.8 07/19/2022 11:31 AM    Alk.  phosphatase 73 07/19/2022 11:31 AM    Protein, total 8.2 07/19/2022 11:31 AM    Albumin 4.5 07/19/2022 11:31 AM    Globulin 3.7 07/19/2022 11:31 AM    A-G Ratio 1.2 07/19/2022 11:31 AM    ALT (SGPT) 142 (H) 07/19/2022 11:31 AM     Lab Results   Component Value Date/Time    WBC 8.0 07/19/2022 11:31 AM    HGB 16.0 07/19/2022 11:31 AM    HCT 46.5 07/19/2022 11:31 AM    PLATELET 782 07/19/2022 11:31 AM    MCV 93.9 07/19/2022 11:31 AM     No results found for: HBA1C, CWX7FQNW, XLN7TTNQ, QBS7WOGX  Lab Results   Component Value Date/Time    Cholesterol, total 236 (H) 07/19/2022 11:31 AM    HDL Cholesterol 41 07/19/2022 11:31 AM    LDL,Direct 100 07/19/2022 11:31 AM    LDL, calculated Not calculated due to elevated triglyceride level 07/19/2022 11:31 AM    VLDL, calculated  07/19/2022 11:31 AM     Calculation not valid with this patient's other Lipid values. Triglyceride 573 (H) 07/19/2022 11:31 AM    CHOL/HDL Ratio 5.8 (H) 07/19/2022 11:31 AM           Nichole Khan MD  [facility]  07/29/22 8:06 AM    Portions of this note may have been populated using smart dictation software and may have \"sounds-like\" errors present. Pt was counseled on risks, benefits and alternatives of treatment options. All questions were asked and answered and the patient was agreeable with the treatment plan as outlined.

## 2022-10-21 DIAGNOSIS — K21.9 GASTROESOPHAGEAL REFLUX DISEASE: ICD-10-CM

## 2022-10-21 DIAGNOSIS — M1A.09X0 IDIOPATHIC CHRONIC GOUT OF MULTIPLE SITES WITHOUT TOPHUS: ICD-10-CM

## 2022-10-23 RX ORDER — ALLOPURINOL 300 MG/1
TABLET ORAL
Qty: 90 TABLET | Refills: 1 | Status: SHIPPED | OUTPATIENT
Start: 2022-10-23

## 2022-10-23 RX ORDER — OMEPRAZOLE 20 MG/1
CAPSULE, DELAYED RELEASE ORAL
Qty: 90 CAPSULE | Refills: 1 | Status: SHIPPED | OUTPATIENT
Start: 2022-10-23

## 2023-04-26 DIAGNOSIS — K21.9 GASTROESOPHAGEAL REFLUX DISEASE: ICD-10-CM

## 2023-04-26 DIAGNOSIS — M1A.09X0 IDIOPATHIC CHRONIC GOUT OF MULTIPLE SITES WITHOUT TOPHUS: ICD-10-CM

## 2023-04-28 RX ORDER — OMEPRAZOLE 20 MG/1
CAPSULE, DELAYED RELEASE ORAL
Qty: 90 CAPSULE | Refills: 0 | Status: SHIPPED | OUTPATIENT
Start: 2023-04-28

## 2023-04-28 RX ORDER — ALLOPURINOL 300 MG/1
TABLET ORAL
Qty: 90 TABLET | Refills: 0 | Status: SHIPPED | OUTPATIENT
Start: 2023-04-28

## 2023-07-26 DIAGNOSIS — M1A.09X0 IDIOPATHIC CHRONIC GOUT, MULTIPLE SITES, WITHOUT TOPHUS (TOPHI): ICD-10-CM

## 2023-07-26 DIAGNOSIS — K21.9 GASTRO-ESOPHAGEAL REFLUX DISEASE WITHOUT ESOPHAGITIS: ICD-10-CM

## 2023-07-26 DIAGNOSIS — I10 ESSENTIAL (PRIMARY) HYPERTENSION: ICD-10-CM

## 2023-07-28 RX ORDER — ALLOPURINOL 300 MG/1
TABLET ORAL
Qty: 90 TABLET | Refills: 0 | Status: SHIPPED | OUTPATIENT
Start: 2023-07-28

## 2023-07-28 RX ORDER — OMEPRAZOLE 20 MG/1
CAPSULE, DELAYED RELEASE ORAL
Qty: 90 CAPSULE | Refills: 0 | Status: SHIPPED | OUTPATIENT
Start: 2023-07-28

## 2023-07-28 RX ORDER — AMLODIPINE BESYLATE 10 MG/1
TABLET ORAL
Qty: 90 TABLET | Refills: 3 | Status: SHIPPED | OUTPATIENT
Start: 2023-07-28

## 2023-09-18 SDOH — ECONOMIC STABILITY: FOOD INSECURITY: WITHIN THE PAST 12 MONTHS, THE FOOD YOU BOUGHT JUST DIDN'T LAST AND YOU DIDN'T HAVE MONEY TO GET MORE.: NEVER TRUE

## 2023-09-18 SDOH — ECONOMIC STABILITY: HOUSING INSECURITY
IN THE LAST 12 MONTHS, WAS THERE A TIME WHEN YOU DID NOT HAVE A STEADY PLACE TO SLEEP OR SLEPT IN A SHELTER (INCLUDING NOW)?: NO

## 2023-09-18 SDOH — ECONOMIC STABILITY: INCOME INSECURITY: HOW HARD IS IT FOR YOU TO PAY FOR THE VERY BASICS LIKE FOOD, HOUSING, MEDICAL CARE, AND HEATING?: NOT VERY HARD

## 2023-09-18 SDOH — ECONOMIC STABILITY: TRANSPORTATION INSECURITY
IN THE PAST 12 MONTHS, HAS LACK OF TRANSPORTATION KEPT YOU FROM MEETINGS, WORK, OR FROM GETTING THINGS NEEDED FOR DAILY LIVING?: NO

## 2023-09-18 SDOH — ECONOMIC STABILITY: FOOD INSECURITY: WITHIN THE PAST 12 MONTHS, YOU WORRIED THAT YOUR FOOD WOULD RUN OUT BEFORE YOU GOT MONEY TO BUY MORE.: NEVER TRUE

## 2023-09-20 ENCOUNTER — TELEMEDICINE (OUTPATIENT)
Facility: CLINIC | Age: 38
End: 2023-09-20
Payer: COMMERCIAL

## 2023-09-20 DIAGNOSIS — F10.90 HABITUAL ALCOHOL USE: ICD-10-CM

## 2023-09-20 DIAGNOSIS — M1A.09X0 IDIOPATHIC CHRONIC GOUT OF MULTIPLE SITES WITHOUT TOPHUS: ICD-10-CM

## 2023-09-20 DIAGNOSIS — Z83.2 FAMILY HISTORY OF BLOOD DYSCRASIA: ICD-10-CM

## 2023-09-20 DIAGNOSIS — E78.1 HYPERTRIGLYCERIDEMIA: ICD-10-CM

## 2023-09-20 DIAGNOSIS — I10 ESSENTIAL HYPERTENSION: ICD-10-CM

## 2023-09-20 DIAGNOSIS — F41.9 ANXIETY: ICD-10-CM

## 2023-09-20 DIAGNOSIS — Z84.1 FAMILY HISTORY OF RENAL FAILURE: ICD-10-CM

## 2023-09-20 DIAGNOSIS — I10 ESSENTIAL HYPERTENSION: Primary | ICD-10-CM

## 2023-09-20 DIAGNOSIS — R79.89 ELEVATED LIVER FUNCTION TESTS: ICD-10-CM

## 2023-09-20 PROCEDURE — 99214 OFFICE O/P EST MOD 30 MIN: CPT | Performed by: FAMILY MEDICINE

## 2023-09-20 RX ORDER — CLONIDINE HYDROCHLORIDE 0.1 MG/1
0.2 TABLET ORAL
COMMUNITY
Start: 2023-08-28

## 2023-09-20 RX ORDER — ESCITALOPRAM OXALATE 10 MG/1
10 TABLET ORAL DAILY
COMMUNITY
Start: 2023-09-03

## 2023-09-20 SDOH — ECONOMIC STABILITY: INCOME INSECURITY: HOW HARD IS IT FOR YOU TO PAY FOR THE VERY BASICS LIKE FOOD, HOUSING, MEDICAL CARE, AND HEATING?: NOT HARD AT ALL

## 2023-09-20 SDOH — ECONOMIC STABILITY: FOOD INSECURITY: WITHIN THE PAST 12 MONTHS, YOU WORRIED THAT YOUR FOOD WOULD RUN OUT BEFORE YOU GOT MONEY TO BUY MORE.: NEVER TRUE

## 2023-09-20 SDOH — ECONOMIC STABILITY: FOOD INSECURITY: WITHIN THE PAST 12 MONTHS, THE FOOD YOU BOUGHT JUST DIDN'T LAST AND YOU DIDN'T HAVE MONEY TO GET MORE.: NEVER TRUE

## 2023-09-20 ASSESSMENT — PATIENT HEALTH QUESTIONNAIRE - PHQ9
SUM OF ALL RESPONSES TO PHQ QUESTIONS 1-9: 0
1. LITTLE INTEREST OR PLEASURE IN DOING THINGS: 0
SUM OF ALL RESPONSES TO PHQ9 QUESTIONS 1 & 2: 0
SUM OF ALL RESPONSES TO PHQ QUESTIONS 1-9: 0
SUM OF ALL RESPONSES TO PHQ QUESTIONS 1-9: 0
2. FEELING DOWN, DEPRESSED OR HOPELESS: 0
SUM OF ALL RESPONSES TO PHQ QUESTIONS 1-9: 0

## 2023-09-20 NOTE — PROGRESS NOTES
Chief Complaint   Patient presents with    Follow-up     Would like to discuss having labs ordered and is due for yearly exam.     1. Have you been to the ER, urgent care clinic since your last visit? Hospitalized since your last visit? No    2. Have you seen or consulted any other health care providers outside of the [address] [place] [address] Avenue since your last visit? Include any pap smears or colon screening.  No

## 2023-09-20 NOTE — PROGRESS NOTES
Julito Piedra is a 30 y.o. male who was seen by synchronous (real-time) audio-video technology on 9/20/2023. Consent: Julito Pidera, who was seen by synchronous (real-time) audio-video technology, and/or his healthcare decision maker, is aware that this patient-initiated, Telehealth encounter on 9/20/2023 is a billable service, with coverage as determined by his insurance carrier. He is aware that he may receive a bill and has provided verbal consent to proceed: Yes. Assessment & Plan:      Diagnosis Orders   1. Essential hypertension  Microalbumin / Creatinine Urine Ratio    Thyroid Cascade Profile      2. Idiopathic chronic gout of multiple sites without tophus  Uric Acid    Microalbumin / Creatinine Urine Ratio      3. Hypertriglyceridemia  Lipid Panel      4. Habitual alcohol use  Comprehensive Metabolic Panel    CBC with Auto Differential    US ABDOMEN LIMITED      5. Anxiety  Comprehensive Metabolic Panel    CBC with Auto Differential    Thyroid Cascade Profile      6. Family history of blood dyscrasia        7. Family history of renal failure        8. Elevated liver function tests  [address] per my orders, fasting  Abstain from alcohol in as much as is possible  Continue to see psychaitry  Bp check in office  No recent gout symptoms  Healthy habits, exercise  High fiber diet 30 g / day    Fu 2 -3 wk in office        Pt was counseled on risks, benefits and alternatives of treatment options. All questions were asked and answered and the patient was agreeable with the treatment plan as outlined. Total time on the date of encounter exceeded 30 minutes and included patient care, coordination of care, charting and preparation for visit.     Subjective:   Julito Piedra is a 30 y.o. male who was seen for Follow-up (Would like to discuss having labs ordered and is due for yearly exam.)      He is now on 2 medications with his psychiatrist. He moved within [place]--that was

## 2023-09-27 LAB
ALBUMIN SERPL-MCNC: 4.5 G/DL (ref 4.1–5.1)
ALBUMIN/GLOB SERPL: 1.5 {RATIO} (ref 1.2–2.2)
ALP SERPL-CCNC: 66 IU/L (ref 44–121)
ALT SERPL-CCNC: 22 IU/L (ref 0–44)
AST SERPL-CCNC: 22 IU/L (ref 0–40)
BASOPHILS # BLD AUTO: 0.1 X10E3/UL (ref 0–0.2)
BASOPHILS NFR BLD AUTO: 1 %
BILIRUB SERPL-MCNC: 1.2 MG/DL (ref 0–1.2)
BUN SERPL-MCNC: 11 MG/DL (ref 6–20)
BUN/CREAT SERPL: 9 (ref 9–20)
CALCIUM SERPL-MCNC: 9.6 MG/DL (ref 8.7–10.2)
CHLORIDE SERPL-SCNC: 96 MMOL/L (ref 96–106)
CHOLEST SERPL-MCNC: 146 MG/DL (ref 100–199)
CO2 SERPL-SCNC: 23 MMOL/L (ref 20–29)
CREAT SERPL-MCNC: 1.23 MG/DL (ref 0.76–1.27)
EGFRCR SERPLBLD CKD-EPI 2021: 79 ML/MIN/1.73
EOSINOPHIL # BLD AUTO: 0.1 X10E3/UL (ref 0–0.4)
EOSINOPHIL NFR BLD AUTO: 1 %
ERYTHROCYTE [DISTWIDTH] IN BLOOD BY AUTOMATED COUNT: 12.8 % (ref 11.6–15.4)
GLOBULIN SER CALC-MCNC: 3.1 G/DL (ref 1.5–4.5)
GLUCOSE SERPL-MCNC: 109 MG/DL (ref 70–99)
HCT VFR BLD AUTO: 46.7 % (ref 37.5–51)
HDLC SERPL-MCNC: 43 MG/DL
HGB BLD-MCNC: 16.4 G/DL (ref 13–17.7)
IMM GRANULOCYTES # BLD AUTO: 0 X10E3/UL (ref 0–0.1)
IMM GRANULOCYTES NFR BLD AUTO: 0 %
IMP & REVIEW OF LAB RESULTS: NORMAL
LDLC SERPL CALC-MCNC: 42 MG/DL (ref 0–99)
LYMPHOCYTES # BLD AUTO: 2.4 X10E3/UL (ref 0.7–3.1)
LYMPHOCYTES NFR BLD AUTO: 39 %
MCH RBC QN AUTO: 32.7 PG (ref 26.6–33)
MCHC RBC AUTO-ENTMCNC: 35.1 G/DL (ref 31.5–35.7)
MCV RBC AUTO: 93 FL (ref 79–97)
MONOCYTES # BLD AUTO: 0.7 X10E3/UL (ref 0.1–0.9)
MONOCYTES NFR BLD AUTO: 11 %
NEUTROPHILS # BLD AUTO: 2.9 X10E3/UL (ref 1.4–7)
NEUTROPHILS NFR BLD AUTO: 48 %
PLATELET # BLD AUTO: 367 X10E3/UL (ref 150–450)
POTASSIUM SERPL-SCNC: 4 MMOL/L (ref 3.5–5.2)
PROT SERPL-MCNC: 7.6 G/DL (ref 6–8.5)
RBC # BLD AUTO: 5.02 X10E6/UL (ref 4.14–5.8)
SODIUM SERPL-SCNC: 135 MMOL/L (ref 134–144)
TRIGL SERPL-MCNC: 424 MG/DL (ref 0–149)
TSH SERPL DL<=0.005 MIU/L-ACNC: 1.21 UIU/ML (ref 0.45–4.5)
URATE SERPL-MCNC: 4 MG/DL (ref 3.8–8.4)
VLDLC SERPL CALC-MCNC: 61 MG/DL (ref 5–40)
WBC # BLD AUTO: 6.2 X10E3/UL (ref 3.4–10.8)

## 2023-10-03 LAB — HBA1C MFR BLD: 5.2 % (ref 4.8–5.6)

## 2023-10-05 LAB — SPECIMEN STATUS REPORT: NORMAL

## 2023-10-06 ENCOUNTER — OFFICE VISIT (OUTPATIENT)
Facility: CLINIC | Age: 38
End: 2023-10-06
Payer: COMMERCIAL

## 2023-10-06 VITALS
HEART RATE: 51 BPM | BODY MASS INDEX: 31.22 KG/M2 | WEIGHT: 206 LBS | HEIGHT: 68 IN | SYSTOLIC BLOOD PRESSURE: 113 MMHG | OXYGEN SATURATION: 98 % | TEMPERATURE: 97.9 F | DIASTOLIC BLOOD PRESSURE: 68 MMHG

## 2023-10-06 DIAGNOSIS — I10 ESSENTIAL HYPERTENSION: Primary | ICD-10-CM

## 2023-10-06 DIAGNOSIS — R79.89 ELEVATED LIVER FUNCTION TESTS: ICD-10-CM

## 2023-10-06 DIAGNOSIS — F41.9 ANXIETY: ICD-10-CM

## 2023-10-06 DIAGNOSIS — E78.1 HYPERTRIGLYCERIDEMIA: ICD-10-CM

## 2023-10-06 DIAGNOSIS — E78.1 PURE HYPERGLYCERIDEMIA: ICD-10-CM

## 2023-10-06 DIAGNOSIS — M1A.09X0 IDIOPATHIC CHRONIC GOUT OF MULTIPLE SITES WITHOUT TOPHUS: ICD-10-CM

## 2023-10-06 PROCEDURE — 3074F SYST BP LT 130 MM HG: CPT | Performed by: FAMILY MEDICINE

## 2023-10-06 PROCEDURE — 99214 OFFICE O/P EST MOD 30 MIN: CPT | Performed by: FAMILY MEDICINE

## 2023-10-06 PROCEDURE — 3078F DIAST BP <80 MM HG: CPT | Performed by: FAMILY MEDICINE

## 2023-10-06 SDOH — ECONOMIC STABILITY: FOOD INSECURITY: WITHIN THE PAST 12 MONTHS, THE FOOD YOU BOUGHT JUST DIDN'T LAST AND YOU DIDN'T HAVE MONEY TO GET MORE.: NEVER TRUE

## 2023-10-06 SDOH — ECONOMIC STABILITY: FOOD INSECURITY: WITHIN THE PAST 12 MONTHS, YOU WORRIED THAT YOUR FOOD WOULD RUN OUT BEFORE YOU GOT MONEY TO BUY MORE.: NEVER TRUE

## 2023-10-06 SDOH — ECONOMIC STABILITY: INCOME INSECURITY: HOW HARD IS IT FOR YOU TO PAY FOR THE VERY BASICS LIKE FOOD, HOUSING, MEDICAL CARE, AND HEATING?: NOT HARD AT ALL

## 2023-10-06 ASSESSMENT — PATIENT HEALTH QUESTIONNAIRE - PHQ9
SUM OF ALL RESPONSES TO PHQ QUESTIONS 1-9: 0
2. FEELING DOWN, DEPRESSED OR HOPELESS: 0
SUM OF ALL RESPONSES TO PHQ QUESTIONS 1-9: 0
SUM OF ALL RESPONSES TO PHQ9 QUESTIONS 1 & 2: 0
1. LITTLE INTEREST OR PLEASURE IN DOING THINGS: 0

## 2023-10-06 NOTE — PATIENT INSTRUCTIONS
BP at goal no change today  Uric acid at goal no change today  Anxiety is much improved , per Dr Thapa Bring are better than last check  Keep up with high fiber heart healthy diet, exercise 150 m / wk mod intensity (this is the basement not the ceiling)--additive in 10 min chunks    Labs reviewed    Check back in 6 mo for bp check sooner as needed  Us pending but fortunately LFT did improve recently

## 2023-10-14 DIAGNOSIS — M1A.09X0 IDIOPATHIC CHRONIC GOUT, MULTIPLE SITES, WITHOUT TOPHUS (TOPHI): ICD-10-CM

## 2023-10-14 DIAGNOSIS — K21.9 GASTRO-ESOPHAGEAL REFLUX DISEASE WITHOUT ESOPHAGITIS: ICD-10-CM

## 2023-10-17 RX ORDER — ALLOPURINOL 300 MG/1
TABLET ORAL
Qty: 90 TABLET | Refills: 3 | Status: SHIPPED | OUTPATIENT
Start: 2023-10-17

## 2023-10-17 RX ORDER — OMEPRAZOLE 20 MG/1
CAPSULE, DELAYED RELEASE ORAL
Qty: 90 CAPSULE | Refills: 3 | Status: SHIPPED | OUTPATIENT
Start: 2023-10-17

## 2023-11-18 ENCOUNTER — HOSPITAL ENCOUNTER (OUTPATIENT)
Facility: HOSPITAL | Age: 38
End: 2023-11-18
Attending: FAMILY MEDICINE
Payer: COMMERCIAL

## 2023-11-18 DIAGNOSIS — F10.90 HABITUAL ALCOHOL USE: ICD-10-CM

## 2023-11-18 DIAGNOSIS — R79.89 ELEVATED LIVER FUNCTION TESTS: ICD-10-CM

## 2023-11-18 PROCEDURE — 76705 ECHO EXAM OF ABDOMEN: CPT

## 2024-05-08 ENCOUNTER — TELEMEDICINE (OUTPATIENT)
Facility: CLINIC | Age: 39
End: 2024-05-08
Payer: COMMERCIAL

## 2024-05-08 DIAGNOSIS — G43.009 MIGRAINE WITHOUT AURA AND WITHOUT STATUS MIGRAINOSUS, NOT INTRACTABLE: Primary | ICD-10-CM

## 2024-05-08 PROCEDURE — 99213 OFFICE O/P EST LOW 20 MIN: CPT | Performed by: FAMILY MEDICINE

## 2024-05-08 RX ORDER — SUMATRIPTAN 50 MG/1
50 TABLET, FILM COATED ORAL
Qty: 9 TABLET | Refills: 0 | Status: SHIPPED | OUTPATIENT
Start: 2024-05-08 | End: 2024-05-08

## 2024-05-08 RX ORDER — ONDANSETRON 4 MG/1
4 TABLET, ORALLY DISINTEGRATING ORAL 3 TIMES DAILY PRN
Qty: 21 TABLET | Refills: 0 | Status: SHIPPED | OUTPATIENT
Start: 2024-05-08

## 2024-05-08 SDOH — ECONOMIC STABILITY: FOOD INSECURITY: WITHIN THE PAST 12 MONTHS, YOU WORRIED THAT YOUR FOOD WOULD RUN OUT BEFORE YOU GOT MONEY TO BUY MORE.: NEVER TRUE

## 2024-05-08 SDOH — ECONOMIC STABILITY: INCOME INSECURITY: HOW HARD IS IT FOR YOU TO PAY FOR THE VERY BASICS LIKE FOOD, HOUSING, MEDICAL CARE, AND HEATING?: NOT HARD AT ALL

## 2024-05-08 SDOH — ECONOMIC STABILITY: FOOD INSECURITY: WITHIN THE PAST 12 MONTHS, THE FOOD YOU BOUGHT JUST DIDN'T LAST AND YOU DIDN'T HAVE MONEY TO GET MORE.: NEVER TRUE

## 2024-05-08 ASSESSMENT — PATIENT HEALTH QUESTIONNAIRE - PHQ9
SUM OF ALL RESPONSES TO PHQ QUESTIONS 1-9: 0
SUM OF ALL RESPONSES TO PHQ9 QUESTIONS 1 & 2: 0
2. FEELING DOWN, DEPRESSED OR HOPELESS: NOT AT ALL
1. LITTLE INTEREST OR PLEASURE IN DOING THINGS: NOT AT ALL

## 2024-05-08 NOTE — PROGRESS NOTES
Ronal Bowen, was evaluated through a synchronous (real-time) audio-video encounter. The patient (or guardian if applicable) is aware that this is a billable service, which includes applicable co-pays. This Virtual Visit was conducted with patient's (and/or legal guardian's) consent. Patient identification was verified, and a caregiver was present when appropriate.   The patient was located at Other: work in [facility]  Provider was located at Facility (Appt Dept): [address] [facility]  [address]  [place] [address]  Confirm you are appropriately licensed, registered, or certified to deliver care in the state where the patient is located as indicated above. If you are not or unsure, please re-schedule the visit: Yes, I confirm.     Ronal Bowen (: 1985) is a Established patient, presenting virtually for evaluation of the following:    ASSESSMENT & PLAN:  Below is the assessment and plan developed based on review of pertinent history, physical exam, labs, studies, and medications.  Assessment & Plan    1. Migraine without aura and without status migrainosus, not intractable  -    The patient is advised to monitor his blood pressure in the mornings. A prescription for Imitrex has been issued, with instructions to take it at the onset of a headache. Additionally, Zofran has been prescribed for nausea. Should the Imitrex prove ineffective, the patient will inform us and we will explore alternative treatment options. In the event of a new onset of migraines, severe headaches, or changes in vision, the patient is advised to seek immediate medical attention at the emergency room.  -  ondansetron (ZOFRAN-ODT) 4 MG disintegrating tablet; Take 1 tablet by mouth 3 times daily as needed for Nausea or Vomiting, Disp-21 tablet, R-0Normal  -     SUMAtriptan (IMITREX) 50 MG tablet; Take 1 tablet by mouth once as needed for Migraine (take at first sign of migraine, may repeat in one hour if needed, do not

## 2024-05-08 NOTE — PROGRESS NOTES
Chief Complaint   Patient presents with    Migraine     Started on Monday 3/10, so bad that he had to leave work because of vision changes especially behind his left eye.     1. Have you been to the ER, urgent care clinic since your last visit?  Hospitalized since your last visit?No    2. Have you seen or consulted any other health care providers outside of the [facility] since your last visit?  Include any pap smears or colon screening. No     [

## 2024-05-28 ENCOUNTER — APPOINTMENT (OUTPATIENT)
Facility: HOSPITAL | Age: 39
End: 2024-05-28
Payer: COMMERCIAL

## 2024-05-28 ENCOUNTER — HOSPITAL ENCOUNTER (EMERGENCY)
Facility: HOSPITAL | Age: 39
Discharge: HOME | End: 2024-05-28
Attending: EMERGENCY MEDICINE
Payer: COMMERCIAL

## 2024-05-28 VITALS
HEART RATE: 80 BPM | OXYGEN SATURATION: 98 % | WEIGHT: 220 LBS | HEIGHT: 69 IN | SYSTOLIC BLOOD PRESSURE: 155 MMHG | DIASTOLIC BLOOD PRESSURE: 72 MMHG | TEMPERATURE: 97.7 F | BODY MASS INDEX: 32.58 KG/M2 | RESPIRATION RATE: 14 BRPM

## 2024-05-28 DIAGNOSIS — R55 SYNCOPE AND COLLAPSE: Primary | ICD-10-CM

## 2024-05-28 DIAGNOSIS — F10.90 ALCOHOL USE DISORDER: ICD-10-CM

## 2024-05-28 DIAGNOSIS — R07.9 CHEST PAIN WITH LOW RISK FOR CARDIAC ETIOLOGY: ICD-10-CM

## 2024-05-28 DIAGNOSIS — F43.0 ACUTE REACTION TO SITUATIONAL STRESS: ICD-10-CM

## 2024-05-28 LAB
ALBUMIN SERPL-MCNC: 4.1 G/DL (ref 3.5–5)
ALBUMIN/GLOB SERPL: 1 (ref 1.1–2.2)
ALP SERPL-CCNC: 70 U/L (ref 45–117)
ALT SERPL-CCNC: 39 U/L (ref 12–78)
AMPHET UR QL SCN: NEGATIVE
ANION GAP SERPL CALC-SCNC: 17 MMOL/L (ref 5–15)
APPEARANCE UR: CLEAR
AST SERPL-CCNC: 18 U/L (ref 15–37)
BACTERIA URNS QL MICRO: NEGATIVE /HPF
BARBITURATES UR QL SCN: NEGATIVE
BASOPHILS # BLD: 0.1 K/UL (ref 0–0.1)
BASOPHILS NFR BLD: 1 % (ref 0–1)
BENZODIAZ UR QL: NEGATIVE
BILIRUB SERPL-MCNC: 0.7 MG/DL (ref 0.2–1)
BILIRUB UR QL: NEGATIVE
BUN SERPL-MCNC: 11 MG/DL (ref 6–20)
BUN/CREAT SERPL: 10 (ref 12–20)
CALCIUM SERPL-MCNC: 8.8 MG/DL (ref 8.5–10.1)
CANNABINOIDS UR QL SCN: NEGATIVE
CHLORIDE SERPL-SCNC: 97 MMOL/L (ref 97–108)
CO2 SERPL-SCNC: 23 MMOL/L (ref 21–32)
COCAINE UR QL SCN: NEGATIVE
COLOR UR: ABNORMAL
CREAT SERPL-MCNC: 1.15 MG/DL (ref 0.7–1.3)
DIFFERENTIAL METHOD BLD: ABNORMAL
EOSINOPHIL # BLD: 0.1 K/UL (ref 0–0.4)
EOSINOPHIL NFR BLD: 1 % (ref 0–7)
EPITH CASTS URNS QL MICRO: ABNORMAL /LPF
ERYTHROCYTE [DISTWIDTH] IN BLOOD BY AUTOMATED COUNT: 12.2 % (ref 11.5–14.5)
ETHANOL SERPL-MCNC: 173 MG/DL (ref 0–0.08)
GLOBULIN SER CALC-MCNC: 4.1 G/DL (ref 2–4)
GLUCOSE SERPL-MCNC: 113 MG/DL (ref 65–100)
GLUCOSE UR STRIP.AUTO-MCNC: NEGATIVE MG/DL
HCT VFR BLD AUTO: 43.9 % (ref 36.6–50.3)
HGB BLD-MCNC: 15.8 G/DL (ref 12.1–17)
HGB UR QL STRIP: NEGATIVE
IMM GRANULOCYTES # BLD AUTO: 0 K/UL (ref 0–0.04)
IMM GRANULOCYTES NFR BLD AUTO: 0 % (ref 0–0.5)
KETONES UR QL STRIP.AUTO: ABNORMAL MG/DL
LEUKOCYTE ESTERASE UR QL STRIP.AUTO: NEGATIVE
LYMPHOCYTES # BLD: 3.4 K/UL (ref 0.8–3.5)
LYMPHOCYTES NFR BLD: 43 % (ref 12–49)
Lab: NORMAL
MCH RBC QN AUTO: 31.5 PG (ref 26–34)
MCHC RBC AUTO-ENTMCNC: 36 G/DL (ref 30–36.5)
MCV RBC AUTO: 87.5 FL (ref 80–99)
METHADONE UR QL: NEGATIVE
MONOCYTES # BLD: 0.6 K/UL (ref 0–1)
MONOCYTES NFR BLD: 7 % (ref 5–13)
NEUTS SEG # BLD: 3.7 K/UL (ref 1.8–8)
NEUTS SEG NFR BLD: 48 % (ref 32–75)
NITRITE UR QL STRIP.AUTO: NEGATIVE
NRBC # BLD: 0 K/UL (ref 0–0.01)
NRBC BLD-RTO: 0 PER 100 WBC
OPIATES UR QL: NEGATIVE
PCP UR QL: NEGATIVE
PH UR STRIP: 6 (ref 5–8)
PLATELET # BLD AUTO: 428 K/UL (ref 150–400)
PMV BLD AUTO: 9.9 FL (ref 8.9–12.9)
POTASSIUM SERPL-SCNC: 3.6 MMOL/L (ref 3.5–5.1)
PROT SERPL-MCNC: 8.2 G/DL (ref 6.4–8.2)
PROT UR STRIP-MCNC: 30 MG/DL
RBC # BLD AUTO: 5.02 M/UL (ref 4.1–5.7)
RBC #/AREA URNS HPF: ABNORMAL /HPF (ref 0–5)
SODIUM SERPL-SCNC: 137 MMOL/L (ref 136–145)
SP GR UR REFRACTOMETRY: 1.01 (ref 1–1.03)
TROPONIN I SERPL HS-MCNC: 14 NG/L (ref 0–76)
TROPONIN I SERPL HS-MCNC: 15 NG/L (ref 0–76)
UROBILINOGEN UR QL STRIP.AUTO: 0.2 EU/DL (ref 0.2–1)
WBC # BLD AUTO: 7.8 K/UL (ref 4.1–11.1)
WBC URNS QL MICRO: ABNORMAL /HPF (ref 0–4)

## 2024-05-28 PROCEDURE — 81001 URINALYSIS AUTO W/SCOPE: CPT

## 2024-05-28 PROCEDURE — 96361 HYDRATE IV INFUSION ADD-ON: CPT

## 2024-05-28 PROCEDURE — 93005 ELECTROCARDIOGRAM TRACING: CPT | Performed by: EMERGENCY MEDICINE

## 2024-05-28 PROCEDURE — 82077 ASSAY SPEC XCP UR&BREATH IA: CPT

## 2024-05-28 PROCEDURE — 99285 EMERGENCY DEPT VISIT HI MDM: CPT

## 2024-05-28 PROCEDURE — 36415 COLL VENOUS BLD VENIPUNCTURE: CPT

## 2024-05-28 PROCEDURE — 71045 X-RAY EXAM CHEST 1 VIEW: CPT

## 2024-05-28 PROCEDURE — 80307 DRUG TEST PRSMV CHEM ANLYZR: CPT

## 2024-05-28 PROCEDURE — 6360000002 HC RX W HCPCS: Performed by: EMERGENCY MEDICINE

## 2024-05-28 PROCEDURE — 73030 X-RAY EXAM OF SHOULDER: CPT

## 2024-05-28 PROCEDURE — 85025 COMPLETE CBC W/AUTO DIFF WBC: CPT

## 2024-05-28 PROCEDURE — 84484 ASSAY OF TROPONIN QUANT: CPT

## 2024-05-28 PROCEDURE — 96374 THER/PROPH/DIAG INJ IV PUSH: CPT

## 2024-05-28 PROCEDURE — 2580000003 HC RX 258: Performed by: EMERGENCY MEDICINE

## 2024-05-28 PROCEDURE — 80053 COMPREHEN METABOLIC PANEL: CPT

## 2024-05-28 RX ORDER — DIAZEPAM 5 MG/1
5 TABLET ORAL EVERY 8 HOURS PRN
Qty: 15 TABLET | Refills: 0 | Status: SHIPPED | OUTPATIENT
Start: 2024-05-28 | End: 2024-06-02

## 2024-05-28 RX ORDER — SODIUM CHLORIDE, SODIUM LACTATE, POTASSIUM CHLORIDE, AND CALCIUM CHLORIDE .6; .31; .03; .02 G/100ML; G/100ML; G/100ML; G/100ML
1000 INJECTION, SOLUTION INTRAVENOUS ONCE
Status: COMPLETED | OUTPATIENT
Start: 2024-05-28 | End: 2024-05-28

## 2024-05-28 RX ORDER — MELOXICAM 15 MG/1
15 TABLET ORAL DAILY PRN
Qty: 10 TABLET | Refills: 0 | Status: SHIPPED | OUTPATIENT
Start: 2024-05-28

## 2024-05-28 RX ORDER — KETOROLAC TROMETHAMINE 30 MG/ML
15 INJECTION, SOLUTION INTRAMUSCULAR; INTRAVENOUS ONCE
Status: COMPLETED | OUTPATIENT
Start: 2024-05-28 | End: 2024-05-28

## 2024-05-28 RX ADMIN — SODIUM CHLORIDE, POTASSIUM CHLORIDE, SODIUM LACTATE AND CALCIUM CHLORIDE 1000 ML: 600; 310; 30; 20 INJECTION, SOLUTION INTRAVENOUS at 13:01

## 2024-05-28 RX ADMIN — SODIUM CHLORIDE, POTASSIUM CHLORIDE, SODIUM LACTATE AND CALCIUM CHLORIDE 1000 ML: 600; 310; 30; 20 INJECTION, SOLUTION INTRAVENOUS at 11:06

## 2024-05-28 RX ADMIN — KETOROLAC TROMETHAMINE 15 MG: 30 INJECTION, SOLUTION INTRAMUSCULAR at 11:25

## 2024-05-28 ASSESSMENT — PAIN DESCRIPTION - DESCRIPTORS
DESCRIPTORS: ACHING
DESCRIPTORS: ACHING

## 2024-05-28 ASSESSMENT — PAIN - FUNCTIONAL ASSESSMENT: PAIN_FUNCTIONAL_ASSESSMENT: 0-10

## 2024-05-28 ASSESSMENT — PAIN SCALES - GENERAL
PAINLEVEL_OUTOF10: 5
PAINLEVEL_OUTOF10: 10

## 2024-05-28 ASSESSMENT — PAIN DESCRIPTION - ORIENTATION
ORIENTATION: LEFT
ORIENTATION: LEFT

## 2024-05-28 ASSESSMENT — PAIN DESCRIPTION - LOCATION
LOCATION: SHOULDER
LOCATION: SHOULDER

## 2024-05-28 ASSESSMENT — PAIN DESCRIPTION - PAIN TYPE: TYPE: ACUTE PAIN

## 2024-05-28 ASSESSMENT — PAIN DESCRIPTION - FREQUENCY: FREQUENCY: CONTINUOUS

## 2024-05-28 NOTE — ED TRIAGE NOTES
Per ems pt last night had a \"wolff\" drank 3 large seltzers, and a shot of liquor, woke up this morning was sitting on the cough when he had a syncopal episode that was witnessed by his mother who \"slapped him till he woke up\". Pt states at the same time of his syncopal episode he had sudden left arm/shoulder pain

## 2024-05-28 NOTE — ED NOTES
Pt dc'd with edu of dx rx and follow up, pt given resources for mental health. Pt Denies SI and HI. Pt states he feels safe at home. Pt dc'd home with his mom, ambulatory out of the ed with even steady gait nad noted.

## 2024-05-28 NOTE — ED PROVIDER NOTES
[facility] EMERGENCY DEPT  EMERGENCY DEPARTMENT ENCOUNTER      Pt Name: Ronal Bowen  MRN: [id number]  Birthdate 1985  Date of evaluation: 5/28/2024  Provider: Sean Haskins MD    CHIEF COMPLAINT       Chief Complaint   Patient presents with    Loss of Consciousness    Chest Pain         HISTORY OF PRESENT ILLNESS   (Location/Symptom, Timing/Onset, Context/Setting, Quality, Duration, Modifying Factors, Severity)  Note limiting factors.   HPI    38-year-old male with past medical history significant for hypertension, dyslipidemia, migraines, as well as alcohol use disorder presents to ED from home via EMS after he had a syncopal episode this morning.  Patient reports that he recently increased his alcohol intake, reports drinking 3 24-ounce seltzers and a shot of liquor yesterday evening.  He reports increased stressors at home due to his job, children, as well as separation from his wife.  Patient reportedly had a loss of consciousness, awoke to his mother slapping him.  He is awake, denies dizziness, focal weakness/numbness/paresthesia, nausea, but reports pain to his left superior lateral chest wall.  He denies any recent injury or lifting, but is concerned that this could be cardiac.  He does use tobacco products.  He is not interested in treatment for his alcohol use at this time.    Nursing Notes were reviewed.    REVIEW OF SYSTEMS    (2-9 systems for level 4, 10 or more for level 5)     Review of Systems    Except as noted above the remainder of the review of systems was reviewed and negative.       PAST MEDICAL HISTORY     Past Medical History:   Diagnosis Date    Chewing tobacco use 2/22/2018    Essential hypertension 6/20/2018    GERD (gastroesophageal reflux disease)     Idiopathic gout of multiple sites 2/25/2018    Migraine with aura and without status migrainosus, not intractable 2/22/2018         SURGICAL HISTORY       Past Surgical History:   Procedure Laterality Date    ORTHOPEDIC SURGERY

## 2024-05-29 LAB
EKG ATRIAL RATE: 75 BPM
EKG DIAGNOSIS: NORMAL
EKG P AXIS: 30 DEGREES
EKG P-R INTERVAL: 116 MS
EKG Q-T INTERVAL: 406 MS
EKG QRS DURATION: 76 MS
EKG QTC CALCULATION (BAZETT): 453 MS
EKG R AXIS: 45 DEGREES
EKG T AXIS: 46 DEGREES
EKG VENTRICULAR RATE: 75 BPM

## 2024-05-29 PROCEDURE — 93010 ELECTROCARDIOGRAM REPORT: CPT | Performed by: SPECIALIST

## 2024-06-03 ENCOUNTER — OFFICE VISIT (OUTPATIENT)
Facility: CLINIC | Age: 39
End: 2024-06-03
Payer: COMMERCIAL

## 2024-06-03 VITALS
DIASTOLIC BLOOD PRESSURE: 99 MMHG | RESPIRATION RATE: 20 BRPM | HEART RATE: 58 BPM | WEIGHT: 220 LBS | OXYGEN SATURATION: 98 % | TEMPERATURE: 97 F | BODY MASS INDEX: 32.58 KG/M2 | HEIGHT: 69 IN | SYSTOLIC BLOOD PRESSURE: 147 MMHG

## 2024-06-03 DIAGNOSIS — F10.11 ALCOHOL USE DISORDER, MILD, IN EARLY REMISSION, ABUSE: ICD-10-CM

## 2024-06-03 DIAGNOSIS — R55 SYNCOPE AND COLLAPSE: Primary | ICD-10-CM

## 2024-06-03 DIAGNOSIS — I10 ESSENTIAL HYPERTENSION: ICD-10-CM

## 2024-06-03 PROCEDURE — 3080F DIAST BP >= 90 MM HG: CPT | Performed by: FAMILY MEDICINE

## 2024-06-03 PROCEDURE — 99214 OFFICE O/P EST MOD 30 MIN: CPT | Performed by: FAMILY MEDICINE

## 2024-06-03 PROCEDURE — 3077F SYST BP >= 140 MM HG: CPT | Performed by: FAMILY MEDICINE

## 2024-06-03 RX ORDER — ESCITALOPRAM OXALATE 5 MG/1
5 TABLET ORAL DAILY
COMMUNITY
Start: 2024-05-13

## 2024-06-03 RX ORDER — CLONIDINE HYDROCHLORIDE 0.1 MG/1
0.2 TABLET ORAL
Qty: 60 TABLET | Refills: 1 | Status: CANCELLED | OUTPATIENT
Start: 2024-06-03

## 2024-06-03 NOTE — ASSESSMENT & PLAN NOTE
Unsure, etiology includes cardiogenic, substance overuse, seizure. I think it is reasonable to do some more cardiac workup, and can consider further neuro workup if needed, though I suspect if not underlying cardiogenic etiology then likely just alcohol overdose.

## 2024-06-03 NOTE — PROGRESS NOTES
SUBJECTIVES  with respective ASSESSMENT/PLAN:  Mr. Ronal Bowen is a 38 y.o. male established patient who presents for New Patient (New pt from Dr. Ball.  Here to fu for ed visit for syncope at [facility] (ETOH was onboard).  EKG showed some abnormalities of some sort.  Pt does not have hx of afib. +1/6 murmur ) and Alcohol Problem (1 week sober had a relapse after 2 mon th currently has a sponsor and is abstaining from etoh. )  , found to have the followin. Syncope and collapse  Overview:  In the setting of heavy alcohol use, sudden onset syncope without known prodromal. Family hx of palpitations and brother with arrhythmia. Grandmother with pacemaker. Additional histories of congestive heart failure, bypasses, etc. A couple MIs in 55-60 years of age. Patient was concerned about some soft or missing P waves in the ambulance EKG, though not appreciated on the ER EKG. Patient has comorbid alcohol use disorder, CALLY, HTN.  Assessment & Plan:  Unsure, etiology includes cardiogenic, substance overuse, seizure. I think it is reasonable to do some more cardiac workup, and can consider further neuro workup if needed, though I suspect if not underlying cardiogenic etiology then likely just alcohol overdose.  2. Essential hypertension  Overview:  BP Readings from Last 3 Encounters:   24 (!) 147/99   24 (!) 155/72   10/06/23 113/68     Medication: amlodipine 10mg daily  Prev Meds: Clonidine 0.1-0.2mg nightly   Assessment & Plan:  Home BP in high 130s/low 90s. Recommend possible BP medication addition, but reasonable to wait given desire for cardiac workup to clear for exercises and lifestyle changes.  3. Alcohol use disorder, mild, in early remission, abuse  Overview:  Drinking heavy since age 20, 28 days sober was the longest most recently, then relapsed most recently in May 2024.    Therapy: AA meetings, has a sponsor. They are aware of things.    Interval Hx:  - back to sobriety day 7.  Assessment

## 2024-06-03 NOTE — ASSESSMENT & PLAN NOTE
Home BP in high 130s/low 90s. Recommend possible BP medication addition, but reasonable to wait given desire for cardiac workup to clear for exercises and lifestyle changes.

## 2024-06-03 NOTE — PATIENT INSTRUCTIONS
[email or web address]    Keywords and Lifestyle Strategies to consider and look up:    ACTIVITY:  Strength and muscle building burns fats even on rest/recover days, and can even help with healthy physiologic testosterone production (men and women) to make it easier to regulate your metabolism.    Cardio keeps the heart strong, 30 minutes of moderate activity 5 days per week is the goal.    High Intensity Interval Training or H-I-I-T; a method of exercising more intensely with a cominbination of muscle/strength techniques and cardio for 15-20 minutes 3 days per week.    FOODS:  LOW CARBOHYDRATE. HIGH FIBER. HEALTHY FATS. ANTIOXIDANT RICH.    Low carbohydrates:  Carbohydrates are a luxury energy source, quick burning, quick storage, inflammation promoting.    Carbs include simple sugars found in soda pop, sweet tea, juices, and desserts. Other carbohydrate sources that lead to trouble is found in white rice, pasta, noodles, white bread, white potatoes.    But sometimes we should eat some carbs, but they need to be high fiber. In fact, in nature carbohydrates are never found without fiber or protein, but modern food processing has removed those important ingredients,  things that never should have been .     Carbohydrates are also designed for high energy-demanding activities such as running, using our muscles, dancing, etc. And when we only eat them for these situations, it's called Carbohydrate cycling (you reserve carbohydrate consumption primarily for workout days, with overall carb decrease for the entire week). Moving the remaining carb intake primarily to workout days maximizes your physical energy, provides an incentive for workouts, and cycles your metabolism to a more fat-burning mode on your off-days.    Another way to help cycle and \"reset\" your metabolism is to do intermittent fasting. It is acceptable and helpful (16, 24, or 36 hours in a row) for \"reseting\" your

## 2024-06-03 NOTE — PROGRESS NOTES
Chief Complaint   Patient presents with    New Patient     New pt from Dr. Ball.  Here to fu for ed visit for syncope at [facility] (ETOH was onboard).  EKG showed some abnormalities of some sort.  Pt does not have hx of afib. +1/6 murmur     Alcohol Problem     1 week sober had a relapse after 2 mon th currently has a sponsor and is abstaining from etoh.

## 2024-06-11 ENCOUNTER — OFFICE VISIT (OUTPATIENT)
Age: 39
End: 2024-06-11
Payer: COMMERCIAL

## 2024-06-11 VITALS
SYSTOLIC BLOOD PRESSURE: 134 MMHG | HEIGHT: 69 IN | DIASTOLIC BLOOD PRESSURE: 98 MMHG | BODY MASS INDEX: 30.93 KG/M2 | OXYGEN SATURATION: 98 % | HEART RATE: 72 BPM | WEIGHT: 208.8 LBS

## 2024-06-11 DIAGNOSIS — R07.9 CHEST PAIN, UNSPECIFIED TYPE: Primary | ICD-10-CM

## 2024-06-11 DIAGNOSIS — R55 SYNCOPE, UNSPECIFIED SYNCOPE TYPE: ICD-10-CM

## 2024-06-11 DIAGNOSIS — E78.1 HYPERTRIGLYCERIDEMIA: ICD-10-CM

## 2024-06-11 PROCEDURE — 99204 OFFICE O/P NEW MOD 45 MIN: CPT | Performed by: SPECIALIST

## 2024-06-11 PROCEDURE — 3075F SYST BP GE 130 - 139MM HG: CPT | Performed by: SPECIALIST

## 2024-06-11 PROCEDURE — 3079F DIAST BP 80-89 MM HG: CPT | Performed by: SPECIALIST

## 2024-06-11 NOTE — PROGRESS NOTES
Lelo Lees MD. [facility]          Patient: Ronal Bowen  : 1985      Today's Date: 2024        HISTORY OF PRESENT ILLNESS:     History of Present Illness:    From ER visit on 24:    \"38-year-old male with past medical history significant for hypertension, dyslipidemia, migraines, as well as alcohol use disorder presents to ED from home via EMS after he had a syncopal episode this morning.  Patient reports that he recently increased his alcohol intake, reports drinking 3 24-ounce seltzers and a shot of liquor yesterday evening.  He reports increased stressors at home due to his job, children, as well as separation from his wife.  Patient reportedly had a loss of consciousness, awoke to his mother slapping him.  He is awake, denies dizziness, focal weakness/numbness/paresthesia, nausea, but reports pain to his left superior lateral chest wall.  He denies any recent injury or lifting, but is concerned that this could be cardiac.  He does use tobacco products.  He is not interested in treatment for his alcohol use at this time.\"     Of note, no one witnessed him lose consciousness, was just found by parents unconscious after they heard a thud downstairs. He had woken up already and had a cup of coffee. Also reports drinking much more that night than what was documented. He doesn't remember much of what happened from the day before.     Workup in ER included CBC, CMP, troponin, and EKG, which were unrevealing. Blood alcohol level was significantly elevated. Additionally there were T wave inversions in lead III on the EKG from the ambulance.     Had a stress test about 10 years ago for work up of palpitations. That work up was negative.     No other episodes since the ER visit. Was SOB for a couple days after. Hasn't had any alcohol since.        PAST MEDICAL HISTORY:     Past Medical History:   Diagnosis Date    Chewing tobacco use 2018    Essential hypertension 2018    GERD

## 2024-06-11 NOTE — PROGRESS NOTES
Chief Complaint   Patient presents with    Loss of Consciousness     Syncope/collapse      Vitals:    06/11/24 1123 06/11/24 1130 06/11/24 1132   BP: (!) 150/88 (!) 144/102 (!) 134/98   Site: Left Upper Arm Left Upper Arm Left Upper Arm   Position: Supine Standing Sitting   Pulse: 56 70 72   SpO2: 98% 98% 98%   Weight: 94.7 kg (208 lb 12.8 oz)     Height: 1.753 m (5' 9\")           Chest pain: DENIED     Recent hospital stays: DENIED     Refills: DENIED     Patient states that he uses a cpap at night.

## 2024-08-08 DIAGNOSIS — I10 ESSENTIAL (PRIMARY) HYPERTENSION: ICD-10-CM

## 2024-08-08 RX ORDER — AMLODIPINE BESYLATE 10 MG/1
10 TABLET ORAL DAILY
Qty: 90 TABLET | Refills: 1 | Status: SHIPPED | OUTPATIENT
Start: 2024-08-08

## 2024-08-08 NOTE — TELEPHONE ENCOUNTER
[facility] faxed request #[id number]    AMLODIPINE BESYLATE 10 MG    Qty 90    Take 1 tablet by mouth every day    Refills 3

## 2024-11-12 DIAGNOSIS — M1A.09X0 IDIOPATHIC CHRONIC GOUT, MULTIPLE SITES, WITHOUT TOPHUS (TOPHI): ICD-10-CM

## 2024-11-12 RX ORDER — ALLOPURINOL 300 MG/1
300 TABLET ORAL DAILY
Qty: 90 TABLET | Refills: 3 | Status: SHIPPED | OUTPATIENT
Start: 2024-11-12

## 2024-11-12 NOTE — TELEPHONE ENCOUNTER
Please add   OMEPRAZOLE DR 20 MG CAPSULE  Take 1 capsule by mouth every day        [facility] faxed request   [id number]    ALLOPURINOL 300MG TABLET    Qty     Take 1 tablet by mouth every day

## 2024-11-14 DIAGNOSIS — K21.9 GASTRO-ESOPHAGEAL REFLUX DISEASE WITHOUT ESOPHAGITIS: ICD-10-CM

## 2024-11-14 NOTE — TELEPHONE ENCOUNTER
[facility] pharmacy faxed 90 day refill request:    Omeprazole DR 20 mg capsules:  Sig: Take 1 capsule by mouth every day  Quantity: 90  Ldm

## 2024-11-30 NOTE — PROGRESS NOTES
Ronal Bowen, was evaluated through a synchronous (real-time) audio-video encounter. The patient (or guardian if applicable) is aware that this is a billable service, which includes applicable co-pays. This Virtual Visit was conducted with patient's (and/or legal guardian's) consent. Patient identification was verified, and a caregiver was present when appropriate.   The patient was located at Other: [facility]  Provider was located at Facility (Appt Dept): [address]  [address]  [place] [address]  Confirm you are appropriately licensed, registered, or certified to deliver care in the state where the patient is located as indicated above. If you are not or unsure, please re-schedule the visit: Yes, I confirm.     Ronal Bowen (: 1985) is a Established patient, presenting virtually for evaluation of the following:    ASSESSMENT & PLAN:  Below is the assessment and plan developed based on review of pertinent history, physical exam, labs, studies, and medications.  Assessment & Plan      Assessment & Plan  Psychosocial stressors    Continuing with counselor, continuing on Lexapro, denies SI/HI.         Essential (primary) hypertension    Unclear control, encouraged home checks and to follow-up via [facility]         Migraine without aura and without status migrainosus, not intractable    Stable, denies any recent migraines         Hypertriglyceridemia    Previously elevated, reviewed cardiology note, discussed we will add NMR lipoprotein onto next lipid panel this spring.  Reinforced healthy diet habits.         Alcohol use disorder, mild, in early remission, abuse    Attending AA meetings, reports in remission           Return in about 6 months (around 6/3/2025) for Physical (labs including NMR lipid profile).    Patient Instructions   Today we discussed:  If you are able to, please check BP at home and send readings via [facility]  We will plan to meet in May and do blood work

## 2024-12-03 ENCOUNTER — TELEMEDICINE (OUTPATIENT)
Facility: CLINIC | Age: 39
End: 2024-12-03

## 2024-12-03 DIAGNOSIS — I10 ESSENTIAL (PRIMARY) HYPERTENSION: ICD-10-CM

## 2024-12-03 DIAGNOSIS — G43.009 MIGRAINE WITHOUT AURA AND WITHOUT STATUS MIGRAINOSUS, NOT INTRACTABLE: ICD-10-CM

## 2024-12-03 DIAGNOSIS — Z65.8 PSYCHOSOCIAL STRESSORS: Primary | ICD-10-CM

## 2024-12-03 DIAGNOSIS — F10.11 ALCOHOL USE DISORDER, MILD, IN EARLY REMISSION, ABUSE: ICD-10-CM

## 2024-12-03 DIAGNOSIS — E78.1 HYPERTRIGLYCERIDEMIA: ICD-10-CM

## 2024-12-03 SDOH — ECONOMIC STABILITY: FOOD INSECURITY: WITHIN THE PAST 12 MONTHS, THE FOOD YOU BOUGHT JUST DIDN'T LAST AND YOU DIDN'T HAVE MONEY TO GET MORE.: NEVER TRUE

## 2024-12-03 SDOH — ECONOMIC STABILITY: FOOD INSECURITY: WITHIN THE PAST 12 MONTHS, YOU WORRIED THAT YOUR FOOD WOULD RUN OUT BEFORE YOU GOT MONEY TO BUY MORE.: NEVER TRUE

## 2024-12-03 SDOH — ECONOMIC STABILITY: INCOME INSECURITY: HOW HARD IS IT FOR YOU TO PAY FOR THE VERY BASICS LIKE FOOD, HOUSING, MEDICAL CARE, AND HEATING?: NOT HARD AT ALL

## 2024-12-03 ASSESSMENT — PATIENT HEALTH QUESTIONNAIRE - PHQ9
SUM OF ALL RESPONSES TO PHQ QUESTIONS 1-9: 2
SUM OF ALL RESPONSES TO PHQ9 QUESTIONS 1 & 2: 2
SUM OF ALL RESPONSES TO PHQ QUESTIONS 1-9: 2
1. LITTLE INTEREST OR PLEASURE IN DOING THINGS: SEVERAL DAYS
2. FEELING DOWN, DEPRESSED OR HOPELESS: SEVERAL DAYS
SUM OF ALL RESPONSES TO PHQ QUESTIONS 1-9: 2
SUM OF ALL RESPONSES TO PHQ QUESTIONS 1-9: 2

## 2024-12-03 NOTE — ASSESSMENT & PLAN NOTE
Previously elevated, reviewed cardiology note, discussed we will add NMR lipoprotein onto next lipid panel this spring.  Reinforced healthy diet habits.

## 2024-12-03 NOTE — PATIENT INSTRUCTIONS
Today we discussed:  If you are able to, please check BP at home and send readings via [facility]  We will plan to meet in May and do blood work before your appt.    Thank you and great to see you today!   Please reach out on [facility] with any questions.   MIRA Orourke - CNP

## 2024-12-03 NOTE — PROGRESS NOTES
Chief Complaint   Patient presents with    Depression     Ronal Bowen is a 38 y.o. male who presents for a follow up on depression. Patient reports he is currently undergoing a divorce and his father is hospitalized with a terminal illness. He reports symptoms are \"fine\".      \"Have you been to the ER, urgent care clinic since your last visit?  Hospitalized since your last visit?\"    NO    “Have you seen or consulted any other health care providers outside of [facility] since your last visit?”    NO            Click Here for Release of Records Request

## 2025-02-11 DIAGNOSIS — I10 ESSENTIAL (PRIMARY) HYPERTENSION: ICD-10-CM

## 2025-02-11 DIAGNOSIS — K21.9 GASTRO-ESOPHAGEAL REFLUX DISEASE WITHOUT ESOPHAGITIS: ICD-10-CM

## 2025-02-11 RX ORDER — AMLODIPINE BESYLATE 10 MG/1
10 TABLET ORAL DAILY
Qty: 90 TABLET | Refills: 1 | Status: SHIPPED | OUTPATIENT
Start: 2025-02-11

## 2025-02-11 NOTE — TELEPHONE ENCOUNTER
[facility] [address], sent refill requests for both:   Amlodipine 10 MG and   Omeprazole 20 MG

## 2025-08-11 DIAGNOSIS — K21.9 GASTRO-ESOPHAGEAL REFLUX DISEASE WITHOUT ESOPHAGITIS: ICD-10-CM

## 2025-08-11 DIAGNOSIS — I10 ESSENTIAL (PRIMARY) HYPERTENSION: ICD-10-CM

## 2025-08-11 RX ORDER — OMEPRAZOLE 20 MG/1
20 CAPSULE, DELAYED RELEASE ORAL DAILY
Qty: 90 CAPSULE | Refills: 0 | Status: SHIPPED | OUTPATIENT
Start: 2025-08-11

## 2025-08-11 RX ORDER — AMLODIPINE BESYLATE 10 MG/1
10 TABLET ORAL DAILY
Qty: 90 TABLET | Refills: 0 | Status: SHIPPED | OUTPATIENT
Start: 2025-08-11